# Patient Record
Sex: FEMALE | Race: WHITE | NOT HISPANIC OR LATINO | Employment: UNEMPLOYED | URBAN - NONMETROPOLITAN AREA
[De-identification: names, ages, dates, MRNs, and addresses within clinical notes are randomized per-mention and may not be internally consistent; named-entity substitution may affect disease eponyms.]

---

## 2017-02-27 DIAGNOSIS — O00.109 TUBAL PREGNANCY WITHOUT INTRAUTERINE PREGNANCY: ICD-10-CM

## 2017-02-27 LAB
B-HCG SERPL-ACNC: NORMAL IU/L
ERYTHROCYTE [DISTWIDTH] IN BLOOD BY AUTOMATED COUNT: 11.4 % (ref 10–15)
HCT VFR BLD AUTO: 37.1 % (ref 35–47)
HGB BLD-MCNC: 13.2 G/DL (ref 11.7–15.7)
MCH RBC QN AUTO: 30.4 PG (ref 26.5–33)
MCHC RBC AUTO-ENTMCNC: 35.6 G/DL (ref 31.5–36.5)
MCV RBC AUTO: 86 FL (ref 78–100)
PLATELET # BLD AUTO: 215 10E9/L (ref 150–450)
PROGEST SERPL-MCNC: 18.6 NG/ML
RBC # BLD AUTO: 4.34 10E12/L (ref 3.8–5.2)
WBC # BLD AUTO: 5.2 10E9/L (ref 4–11)

## 2017-02-27 PROCEDURE — 84702 CHORIONIC GONADOTROPIN TEST: CPT | Performed by: OBSTETRICS & GYNECOLOGY

## 2017-02-27 PROCEDURE — 36415 COLL VENOUS BLD VENIPUNCTURE: CPT | Performed by: OBSTETRICS & GYNECOLOGY

## 2017-02-27 PROCEDURE — 84144 ASSAY OF PROGESTERONE: CPT | Mod: 90 | Performed by: OBSTETRICS & GYNECOLOGY

## 2017-02-27 PROCEDURE — 85027 COMPLETE CBC AUTOMATED: CPT | Performed by: OBSTETRICS & GYNECOLOGY

## 2017-02-27 PROCEDURE — 99000 SPECIMEN HANDLING OFFICE-LAB: CPT | Performed by: OBSTETRICS & GYNECOLOGY

## 2017-03-01 ENCOUNTER — TELEPHONE (OUTPATIENT)
Dept: OBGYN | Facility: OTHER | Age: 22
End: 2017-03-01

## 2017-03-01 DIAGNOSIS — R10.2 PELVIC CRAMPING: ICD-10-CM

## 2017-03-01 DIAGNOSIS — Z32.01 PREGNANCY EXAMINATION OR TEST, POSITIVE RESULT: ICD-10-CM

## 2017-03-01 DIAGNOSIS — Z87.59 HX OF ECTOPIC PREGNANCY: Primary | ICD-10-CM

## 2017-03-01 NOTE — TELEPHONE ENCOUNTER
Patient had appointment scheduled today with Dr. Mendez that had to be cancelled due to a delivery. History of ectopic. No bleeding or 1-sided pelvic pain. C/o very slight cramping. Per Dr. Mendez, radiology ultrasound ordered and patient will see Dr. Mendez on Monday.

## 2017-03-02 ENCOUNTER — HOSPITAL ENCOUNTER (OUTPATIENT)
Dept: ULTRASOUND IMAGING | Facility: HOSPITAL | Age: 22
Discharge: HOME | End: 2017-03-02
Attending: OBSTETRICS & GYNECOLOGY | Admitting: OBSTETRICS & GYNECOLOGY
Payer: COMMERCIAL

## 2017-03-02 PROCEDURE — 76817 TRANSVAGINAL US OBSTETRIC: CPT | Mod: TC

## 2017-03-06 ENCOUNTER — HOSPITAL ENCOUNTER (EMERGENCY)
Facility: HOSPITAL | Age: 22
Discharge: HOME | End: 2017-03-06
Attending: NURSE PRACTITIONER | Admitting: NURSE PRACTITIONER
Payer: COMMERCIAL

## 2017-03-06 ENCOUNTER — PRENATAL OFFICE VISIT (OUTPATIENT)
Dept: OBGYN | Facility: OTHER | Age: 22
End: 2017-03-06
Attending: OBSTETRICS & GYNECOLOGY
Payer: COMMERCIAL

## 2017-03-06 VITALS
SYSTOLIC BLOOD PRESSURE: 113 MMHG | DIASTOLIC BLOOD PRESSURE: 66 MMHG | OXYGEN SATURATION: 99 % | TEMPERATURE: 97.5 F | HEART RATE: 66 BPM | RESPIRATION RATE: 16 BRPM

## 2017-03-06 VITALS
HEART RATE: 66 BPM | HEIGHT: 64 IN | SYSTOLIC BLOOD PRESSURE: 144 MMHG | WEIGHT: 117 LBS | DIASTOLIC BLOOD PRESSURE: 80 MMHG | BODY MASS INDEX: 19.97 KG/M2

## 2017-03-06 DIAGNOSIS — Z3A.01 LESS THAN 8 WEEKS GESTATION OF PREGNANCY: ICD-10-CM

## 2017-03-06 DIAGNOSIS — R51.9 HEAD AND FACE PAIN: ICD-10-CM

## 2017-03-06 DIAGNOSIS — Z87.59 HX OF ECTOPIC PREGNANCY: Primary | ICD-10-CM
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
## 2017-03-06 DIAGNOSIS — Z32.01 PREGNANCY EXAMINATION OR TEST, POSITIVE RESULT: ICD-10-CM

## 2017-03-06 PROBLEM — K59.00 CONSTIPATION: Status: ACTIVE | Noted: 2017-03-06

## 2017-03-06 PROCEDURE — 99213 OFFICE O/P EST LOW 20 MIN: CPT | Performed by: NURSE PRACTITIONER

## 2017-03-06 PROCEDURE — 99213 OFFICE O/P EST LOW 20 MIN: CPT

## 2017-03-06 PROCEDURE — 99212 OFFICE O/P EST SF 10 MIN: CPT | Mod: 25 | Performed by: OBSTETRICS & GYNECOLOGY

## 2017-03-06 PROCEDURE — 76817 TRANSVAGINAL US OBSTETRIC: CPT | Performed by: OBSTETRICS & GYNECOLOGY

## 2017-03-06 RX ORDER — HYDROCODONE BITARTRATE AND ACETAMINOPHEN 5; 325 MG/1; MG/1
1 TABLET ORAL 3 TIMES DAILY PRN
Qty: 10 TABLET | Refills: 0 | Status: SHIPPED | OUTPATIENT
Start: 2017-03-06 | End: 2017-03-22

## 2017-03-06 RX ORDER — ACETAMINOPHEN 500 MG
500-1000 TABLET ORAL EVERY 6 HOURS PRN
Qty: 60 TABLET | Refills: 0 | Status: SHIPPED | OUTPATIENT
Start: 2017-03-06 | End: 2017-12-12

## 2017-03-06 ASSESSMENT — ENCOUNTER SYMPTOMS
NECK PAIN: 0
CARDIOVASCULAR NEGATIVE: 1
RESPIRATORY NEGATIVE: 1
WEAKNESS: 0
GASTROINTESTINAL NEGATIVE: 1
CONSTITUTIONAL NEGATIVE: 1
PSYCHIATRIC NEGATIVE: 1
HEADACHES: 1
FACIAL ASYMMETRY: 0
DIZZINESS: 0
NECK STIFFNESS: 0
EYES NEGATIVE: 1
BACK PAIN: 0
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
## 2017-03-06 NOTE — LETTER
[address] [place] [address]  [phone]      April 13, 2017      Marisol Monroy  [address] [facility] [address]        Dear Marisol,      Thank you for coming to the [facility]. We were unable to reach you by phone. This letter is to inform of your results from your first trimester blood work and nuchal translucency ultrasound. This is a screening for Down syndrome, Trisomy 13 and Trisomy 18. Your results are within normal range. This is a good and reassuring result. If you have questions pertaining to this results, please call the nurse at [phone]        Sincerely,      Digna Mendez MD/Sharon KEVIN LPN

## 2017-03-06 NOTE — ED AVS SNAPSHOT
[facility]    [address] [address]    Phone:  [phone]                                       Marisol Monroy   MRN: [id number]    Department:  [facility]   Date of Visit:  3/6/2017           After Visit Summary Signature Page     I have received my discharge instructions, and my questions have been answered. I have discussed any challenges I see with this plan with the nurse or doctor.    ..........................................................................................................................................  Patient/Patient Representative Signature      ..........................................................................................................................................  Patient Representative Print Name and Relationship to Patient    ..................................................               ................................................  Date                                            Time    ..........................................................................................................................................  Reviewed by Signature/Title    ...................................................              ..............................................  Date                                                            Time

## 2017-03-09 ASSESSMENT — ANXIETY QUESTIONNAIRES
1. FEELING NERVOUS, ANXIOUS, OR ON EDGE: NOT AT ALL
7. FEELING AFRAID AS IF SOMETHING AWFUL MIGHT HAPPEN: SEVERAL DAYS
5. BEING SO RESTLESS THAT IT IS HARD TO SIT STILL: MORE THAN HALF THE DAYS
IF YOU CHECKED OFF ANY PROBLEMS ON THIS QUESTIONNAIRE, HOW DIFFICULT HAVE THESE PROBLEMS MADE IT FOR YOU TO DO YOUR WORK, TAKE CARE OF THINGS AT HOME, OR GET ALONG WITH OTHER PEOPLE: NOT DIFFICULT AT ALL
2. NOT BEING ABLE TO STOP OR CONTROL WORRYING: SEVERAL DAYS
3. WORRYING TOO MUCH ABOUT DIFFERENT THINGS: SEVERAL DAYS
GAD7 TOTAL SCORE: 8
6. BECOMING EASILY ANNOYED OR IRRITABLE: MORE THAN HALF THE DAYS

## 2017-03-09 ASSESSMENT — PATIENT HEALTH QUESTIONNAIRE - PHQ9: 5. POOR APPETITE OR OVEREATING: SEVERAL DAYS

## 2017-03-10 ASSESSMENT — ANXIETY QUESTIONNAIRES: GAD7 TOTAL SCORE: 8

## 2017-03-10 ASSESSMENT — PATIENT HEALTH QUESTIONNAIRE - PHQ9: SUM OF ALL RESPONSES TO PHQ QUESTIONS 1-9: 4

## 2017-03-22 ENCOUNTER — PRENATAL OFFICE VISIT (OUTPATIENT)
Dept: OBGYN | Facility: OTHER | Age: 22
End: 2017-03-22
Attending: OBSTETRICS & GYNECOLOGY
Payer: COMMERCIAL

## 2017-03-22 VITALS
DIASTOLIC BLOOD PRESSURE: 62 MMHG | SYSTOLIC BLOOD PRESSURE: 122 MMHG | BODY MASS INDEX: 20.66 KG/M2 | WEIGHT: 121 LBS | HEIGHT: 64 IN

## 2017-03-22 DIAGNOSIS — Z34.80 SUPERVISION OF OTHER NORMAL PREGNANCY, ANTEPARTUM: Primary | ICD-10-CM

## 2017-03-22 PROCEDURE — 99207 ZZC PRENATAL VISIT: CPT | Mod: 25 | Performed by: OBSTETRICS & GYNECOLOGY

## 2017-03-22 PROCEDURE — 76817 TRANSVAGINAL US OBSTETRIC: CPT | Performed by: OBSTETRICS & GYNECOLOGY

## 2017-03-22 RX ORDER — PRENATAL VIT/IRON FUM/FOLIC AC 27MG-0.8MG
1 TABLET ORAL DAILY
COMMUNITY
End: 2018-04-17

## 2017-03-22 NOTE — MR AVS SNAPSHOT
After Visit Summary   3/22/2017    Marisol Monroy    MRN: [id number]           Patient Information     Date Of Birth          1995        Visit Information        Provider Department      3/22/2017 9:00 AM Digna Mendez MD [facility]        Today's Diagnoses     Supervision of other normal pregnancy, antepartum    -  1      Care Instructions    Return for New OB visit.        Follow-ups after your visit        Your next 10 appointments already scheduled     Apr 03, 2017  9:00 AM CDT   (Arrive by 8:45 AM)   New Prenatal with Digna Mendez MD   [facility] ([facility])    [address]  [facility] [address]   [phone]            Apr 10, 2017 11:00 AM CDT   Radiology with [facility] ULTRASOUND 2   [facility] ([facility] )    [address] [place] [address]   [phone]            Jun 06, 2017  9:00 AM CDT   Radiology with HI ULTRASOUND 2   [facility] ([facility] )    [address] [place] [address]   [phone]            Dec 07, 2017 10:00 AM CST   (Arrive by 9:45 AM)   [facility]    [address]  [place] [address]   [phone]           Bring a current list of meds and any records pertaining to this visit.  For Physicals, please bring immunization records and any forms needing to be filled out.  Please arrive 10 minutes early to complete paperwork.              Who to contact     If you have questions or need follow up information about today's clinic visit or your schedule please contact [facility] directly at [phone].  Normal or non-critical lab and imaging results will be communicated to you by MyChart, letter or phone within 4 business days after the clinic has received the results. If you do not hear from us within 7 days, please contact the clinic through MyChart or phone. If you have a critical or  "abnormal lab result, we will notify you by phone as soon as possible.  Submit refill requests through [employer] or call your pharmacy and they will forward the refill request to us. Please allow 3 business days for your refill to be completed.          Additional Information About Your Visit        [employer] Information     [employer] lets you send messages to your doctor, view your test results, renew your prescriptions, schedule appointments and more. To sign up, go to [email or web address] . Click on \"Log in\" on the left side of the screen, which will take you to the Welcome page. Then click on \"Sign up Now\" on the right side of the page.     You will be asked to enter the access code listed below, as well as some personal information. Please follow the directions to create your username and password.     Your access code is: [email or web address]  Expires: 2017 12:25 PM     Your access code will  in 90 days. If you need help or a new code, please call your [place] clinic or [phone].        Care EveryWhere ID     This is your Care EveryWhere ID. This could be used by other organizations to access your [place] medical records  [id number]        Your Vitals Were     Height BMI (Body Mass Index)                5' 4\" (1.626 m) 20.77 kg/m2           Blood Pressure from Last 3 Encounters:   17 122/62   17 113/66   17 144/80    Weight from Last 3 Encounters:   17 121 lb (54.9 kg)   17 117 lb (53.1 kg)   16 125 lb (56.7 kg)              We Performed the Following     US OB (Clinic Only)        Primary Care Provider    None       No address on file        Thank you!     Thank you for choosing [facility] [place]  for your care. Our goal is always to provide you with excellent care. Hearing back from our patients is one way we can continue to improve our services. Please take a few minutes to complete the written survey that you may receive in the mail after your visit with us. " Thank you!             Your Updated Medication List - Protect others around you: Learn how to safely use, store and throw away your medicines at www.disposemymeds.org.          This list is accurate as of: 3/22/17  4:19 PM.  Always use your most recent med list.                   Brand Name Dispense Instructions for use    acetaminophen 500 MG tablet    TYLENOL    60 tablet    Take 1-2 tablets (500-1,000 mg) by mouth every 6 hours as needed for mild pain       prenatal multivitamin  plus iron 27-0.8 MG Tabs per tablet      Take 1 tablet by mouth daily

## 2017-03-22 NOTE — PROGRESS NOTES
"Here for doc of dates and viability  /62  Ht 5' 4\" (1.626 m)  Wt 121 lb (54.9 kg)  BMI 20.77 kg/m2      Uterus: normal  Gest. Sac: reg  Yolk Sac: p  Fetal Pole: 8w6d with good fm  FHT: +  Fluid: normal  Gest Age: 8w6d  Sc =sc with no dates  Done by Digna Mendez MD    A: IUP    P: rto NOB  11 wk NT etc    Greater than  15 minutes were spent counseling this patient face to face in addition to the ultrasound.      "

## 2017-04-03 ENCOUNTER — PRENATAL OFFICE VISIT (OUTPATIENT)
Dept: OBGYN | Facility: OTHER | Age: 22
End: 2017-04-03
Attending: OBSTETRICS & GYNECOLOGY
Payer: COMMERCIAL

## 2017-04-03 VITALS
HEIGHT: 64 IN | DIASTOLIC BLOOD PRESSURE: 60 MMHG | WEIGHT: 121 LBS | BODY MASS INDEX: 20.66 KG/M2 | SYSTOLIC BLOOD PRESSURE: 112 MMHG

## 2017-04-03 DIAGNOSIS — Z34.80 SUPERVISION OF OTHER NORMAL PREGNANCY, ANTEPARTUM: Primary | ICD-10-CM
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
## 2017-04-03 PROCEDURE — 99207 ZZC FIRST OB VISIT: CPT | Performed by: OBSTETRICS & GYNECOLOGY

## 2017-04-03 NOTE — PROGRESS NOTES
NEW OB VISIT  Marisol Monroy is a 21 year old  at 10w5d presenting for a new ob visit.      Currently taking PNV? y    MEDICAL HISTORY:  Diabetes: n  Hypertension: n  Heart Disease: n  Autoimmune disorder: n  Kidney Disease/UTI: n  Neurologic Disease/Epilepsy: n  Psychiatric Disease: n  Depression/Postpartum Depression: n  Varicositites/Phlebitis: n  Hepatitis/Liver Disease: n  Thyroid Dysfunction: n  Trauma/Violence: n  History of Blood Transfusion: n  Tobacco Use: n  Alcohol Use: n  Illicit/Recreational Drugs: n  D (Rh Sensitized): n  Pulmonary Disease (TB/Asthma): n  Drug/Latex Allergies/Reactions: n  Breast: n  GYN Surgery: y  Operations/Hospitalizations: ectopic  Anesthetic Complications: n  History of Abnormal Pap: n  Uterine Anomalies/KALE: n  Infertility: n  ART Treatment: n  Relevant Family History: n   Other/Comments: n    INFECTION HISTORY:  Live with someone with TB or exposed to TB: n  Patient or Partner has history of Genital Herpes: n  Rash or viral illness or fever since LMP: n  Hepatitis B or C: n  History of STI (Gonorrhea, Chlamydia, HPV, HIV, Syphilis): n  Zika exposure n  Other: n  Cats n    BABY DOC: RE             Breast feeding: y RD y      IMMUNIZATION HISTORY:  Chicken Pox: y  Flu Vaccine:  In fall  Pneumococcal if smoker or RAD:  n  Tdap: in 3  Gardasil: needs  Other/comments: n    FAMILY HISTORY  Diabetes: n  Hypertension: m and br  CVA/Stroke: n  Lupus: n  Cancers: Breast  pgm ovarian n,colon n,uterine: n           Genetics Screening/Teratology Counseling:  Includes Patient, Baby's Father, or anyone in either family with:  Patient's age 35 years or older as of estimated date of delivery:  n  Thalassemia: MCV less than 80: n  Neural Tube defects: n  Congenital Heart Defects: n  Down syndrome: n  Hugo-Sachs: n  Canavan Disease: n  Familial Dysautonomia: n  Sickle Cell Disease or Trait: n  Hemophilia or other blood disorders: n  Muscular Dystrophy: n  Cystic Fibrosis: n  Haskell's  "Chorea: n  Mental Retardation or Autism: n  Other genetic or chromosomal disorders: n  Maternal Metabolic Disorder (Type 1 DM, PKU): n  Patient or baby's father with birth defects not listed above: n  Recurrent pregnancy loss or stillbirth: n  Medications (Supplements, drugs)/ Illicit/ Recreational drugs/ Alcohol since LMP: n  Other/Comments: n    Review Of Systems:   C:     NEGATIVE for fever, chills, change in weight  I:       NEGATIVE for worrisome rashes, moles or lesions  E:     NEGATIVE for vision changes or irritation  E/M: NEGATIVE for ear, mouth and throat problems  R:     NEGATIVE for significant cough or SOB  CV:   NEGATIVE for chest pain, palpitations or peripheral edema  GI:     NEGATIVE for unusual nausea, abdominal pain, heartburn, or change in bowel n  :   NEGATIVE for frequency, dysuria, hematuria, vaginal discharge or bleeding  M:     NEGATIVE for significant arthralgias or myalgia  N:      NEGATIVE for weakness, dizziness or paresthesias  E:      NEGATIVE for temperature intolerance, skin/hair changes  P:      NEGATIVE for changes in mood or affect.     PHYSICAL EXAM:   /60  Ht 5' 4\" (1.626 m)  Wt 121 lb (54.9 kg)  BMI 20.77 kg/m2   BMI: Body mass index is 20.77 kg/(m^2).  Constitutional: healthy, alert and no distress  Head: Normocephalic. No masses, lesions, tenderness or abnormalities  Neck: Neck supple. Trachea midline. No adenopathy. Thyroid symmetric, normal size.   Cardiovascular: RRR.   Respiratory: lungs clear   Breast: Breasts reveal mild symmetric fibrocystic densities, but there are no dominant, discrete, fixed or suspicious masses found.  Gastrointestinal: Abdomen soft, non-tender, non-distended. No masses, organomegaly.  Pelvic:  Vulva:  No external lesions, normal female hair distribution, no inguinal adenopathy.    Urethra:  Midline, non-tender, well supported, no discharge  Vagina:  Moist, pink, no abnormal discharge, no lesions  Uterus:   gravid , non-tender  Ovaries:  " No masses appreciated  Rectal Exam: deferred    Musculoskeletal: extremities normal  Skin: no suspicious lesions or rashes  Psychiatric: Affect appropriate, cooperative,mentation appears normal.     Risk assessment done. Level is   mod    ASSESSMENT:   IUP with hx ectopic    PLAN:  Prenatal labs done  11-13 weeks 1st trimester NT/Bloodwork  16 wk MSAFP  Cell free DNA discussed  Level II Ultrasound at 20 weeks   Tdap at 27 weeks  Check MIIC  Flu shot in fall  RTO 1 wk    Greater than 25 were spent in face to face counseling and interview by me for this initial new ob visit.  Digna Mendez MD

## 2017-04-03 NOTE — MR AVS SNAPSHOT
After Visit Summary   4/3/2017    Marisol Monroy    MRN: [id number]           Patient Information     Date Of Birth          1995        Visit Information        Provider Department      4/3/2017 9:00 AM Digna Mendez MD [facility] [place]        Today's Diagnoses     Supervision of other normal pregnancy, antepartum    -  1      Care Instructions    1st trimester screening blood work and nuchal translucency ultrasound @ 11-13 6/7 weeks. You will receive a call from ultrasound to schedule this appt. Please call the nurse at [phone] if you are not contacted by 12 weeks with an appointment time.  MSAFP in lab @ 15-16 weeks.  Level 2 ultrasound @ 18-22 weeks. You will receive a call from ultrasound to make this appt. Please contact the nurse if you have not been contacted with an appointment time by 17 weeks.   Tdap vaccine at 27 weeks.  Flu shot in fall.  Return to clinic in 1 week.                Follow-ups after your visit        Your next 10 appointments already scheduled     Apr 10, 2017 11:00 AM CDT   Radiology with HI ULTRASOUND 2   [facility] ([facility] )    [address] [place] [address]   [phone]            Jun 06, 2017  9:00 AM CDT   Radiology with HI ULTRASOUND 2   [facility] ([facility] )    [address] [place] [address]   [phone]            Dec 07, 2017 10:00 AM CST   (Arrive by 9:45 AM)   Office Visit with BELINDA Snow CNM   [facility] ([facility])    [address] [facility] [address]   [phone]           Bring a current list of meds and any records pertaining to this visit.  For Physicals, please bring immunization records and any forms needing to be filled out.  Please arrive 10 minutes early to complete paperwork.              Who to contact     If you have questions or need follow up information about today's clinic visit or your schedule please contact [facility]  "[place] directly at [phone].  Normal or non-critical lab and imaging results will be communicated to you by MyChart, letter or phone within 4 business days after the clinic has received the results. If you do not hear from us within 7 days, please contact the clinic through [employer] or phone. If you have a critical or abnormal lab result, we will notify you by phone as soon as possible.  Submit refill requests through [employer] or call your pharmacy and they will forward the refill request to us. Please allow 3 business days for your refill to be completed.          Additional Information About Your Visit        [employer] Information     [employer] lets you send messages to your doctor, view your test results, renew your prescriptions, schedule appointments and more. To sign up, go to [email or web address] . Click on \"Log in\" on the left side of the screen, which will take you to the Welcome page. Then click on \"Sign up Now\" on the right side of the page.     You will be asked to enter the access code listed below, as well as some personal information. Please follow the directions to create your username and password.     Your access code is: [email or web address]  Expires: 2017 12:25 PM     Your access code will  in 90 days. If you need help or a new code, please call your [place] clinic or [phone].        Care EveryWhere ID     This is your Care EveryWhere ID. This could be used by other organizations to access your [place] medical records  [id number]        Your Vitals Were     Height BMI (Body Mass Index)                5' 4\" (1.626 m) 20.77 kg/m2           Blood Pressure from Last 3 Encounters:   17 112/60   17 122/62   17 113/66    Weight from Last 3 Encounters:   17 121 lb (54.9 kg)   17 121 lb (54.9 kg)   17 117 lb (53.1 kg)              Today, you had the following     No orders found for display       Primary Care Provider    None       No address on file      "   Thank you!     Thank you [facility]  for your care. Our goal is always to provide you with excellent care. Hearing back from our patients is one way we can continue to improve our services. Please take a few minutes to complete the written survey that you may receive in the mail after your visit with us. Thank you!             Your Updated Medication List - Protect others around you: Learn how to safely use, store and throw away your medicines at www.disposemymeds.org.          This list is accurate as of: 4/3/17  9:38 AM.  Always use your most recent med list.                   Brand Name Dispense Instructions for use    acetaminophen 500 MG tablet    TYLENOL    60 tablet    Take 1-2 tablets (500-1,000 mg) by mouth every 6 hours as needed for mild pain       prenatal multivitamin  plus iron 27-0.8 MG Tabs per tablet      Take 1 tablet by mouth daily

## 2017-04-10 ENCOUNTER — HOSPITAL ENCOUNTER (OUTPATIENT)
Dept: ULTRASOUND IMAGING | Facility: HOSPITAL | Age: 22
Discharge: HOME | End: 2017-04-10
Attending: OBSTETRICS & GYNECOLOGY | Admitting: OBSTETRICS & GYNECOLOGY
Payer: COMMERCIAL

## 2017-04-10 PROCEDURE — 76813 OB US NUCHAL MEAS 1 GEST: CPT | Mod: TC

## 2017-04-10 PROCEDURE — 36415 COLL VENOUS BLD VENIPUNCTURE: CPT

## 2017-04-18 ENCOUNTER — PRENATAL OFFICE VISIT (OUTPATIENT)
Dept: OBGYN | Facility: OTHER | Age: 22
End: 2017-04-18
Attending: OBSTETRICS & GYNECOLOGY
Payer: COMMERCIAL

## 2017-04-18 VITALS — BODY MASS INDEX: 20.6 KG/M2 | WEIGHT: 120 LBS | DIASTOLIC BLOOD PRESSURE: 58 MMHG | SYSTOLIC BLOOD PRESSURE: 118 MMHG

## 2017-04-18 DIAGNOSIS — Z34.80 SUPERVISION OF OTHER NORMAL PREGNANCY, ANTEPARTUM: Primary | ICD-10-CM

## 2017-04-18 PROCEDURE — 99207 ZZC PRENATAL VISIT: CPT | Performed by: OBSTETRICS & GYNECOLOGY

## 2017-04-18 NOTE — MR AVS SNAPSHOT
After Visit Summary   4/18/2017    Marisol Monroy    MRN: [id number]           Patient Information     Date Of Birth          1995        Visit Information        Provider Department      4/18/2017 10:50 AM Digna Mendez MD [facility]        Today's Diagnoses     Supervision of other normal pregnancy, antepartum    -  1       Follow-ups after your visit        Your next 10 appointments already scheduled     Apr 25, 2017 10:50 AM CDT   (Arrive by 10:35 AM)   Home Follow Up with Digna Mendez MD   [facility] [place] ([facility] [place] [facility])    [address]  [place] [address]   [phone]            May 03, 2017  8:30 AM CDT   LAB with HC LAB   [facility] ([facility] [place] [facility])    [address]  [place] [address]   [phone]            May 03, 2017  8:40 AM CDT   (Arrive by 8:25 AM)   ESTABLISHED PRENATAL with Digna Mendez MD   [facility] ([facility] [place] [facility])    [address]  [place] [address]   [phone]            Jun 06, 2017  9:00 AM CDT   Radiology with HI ULTRASOUND 2   [facility] ([facility] )    [address] [place] [address] [place] [address]   [phone]            Dec 07, 2017 10:00 AM CST   (Arrive by 9:45 AM)   Office Visit with BELINDA Snow [facility] [place] ([facility] [place] [facility])    [address]  [place] [address]   [phone]           Bring a current list of meds and any records pertaining to this visit.  For Physicals, please bring immunization records and any forms needing to be filled out.  Please arrive 10 minutes early to complete paperwork.              Who to contact     If you have questions or need follow up information about today's clinic visit or your schedule please contact [facility] directly at [phone].  Normal or non-critical lab and imaging results will be communicated to you by MyChart, letter or phone within 4 business days after the  "clinic has received the results. If you do not hear from us within 7 days, please contact the clinic through [employer] or phone. If you have a critical or abnormal lab result, we will notify you by phone as soon as possible.  Submit refill requests through [employer] or call your pharmacy and they will forward the refill request to us. Please allow 3 business days for your refill to be completed.          Additional Information About Your Visit        [employer] Information     [employer] lets you send messages to your doctor, view your test results, renew your prescriptions, schedule appointments and more. To sign up, go to [email or web address] . Click on \"Log in\" on the left side of the screen, which will take you to the Welcome page. Then click on \"Sign up Now\" on the right side of the page.     You will be asked to enter the access code listed below, as well as some personal information. Please follow the directions to create your username and password.     Your access code is: [email or web address]  Expires: 2017 12:25 PM     Your access code will  in 90 days. If you need help or a new code, please call your [place] clinic or [phone].        Care EveryWhere ID     This is your Care EveryWhere ID. This could be used by other organizations to access your [place] medical records  [id number]        Your Vitals Were     BMI (Body Mass Index)                   20.6 kg/m2            Blood Pressure from Last 3 Encounters:   17 118/58   17 112/60   17 122/62    Weight from Last 3 Encounters:   17 120 lb (54.4 kg)   17 121 lb (54.9 kg)   17 121 lb (54.9 kg)              Today, you had the following     No orders found for display       Primary Care Provider    None       No address on file        Thank you!     Thank you for choosing [facility] [place]  for your care. Our goal is always to provide you with excellent care. Hearing back from our patients is one way we can " continue to improve our services. Please take a few minutes to complete the written survey that you may receive in the mail after your visit with us. Thank you!             Your Updated Medication List - Protect others around you: Learn how to safely use, store and throw away your medicines at www.disposemymeds.org.          This list is accurate as of: 4/18/17 12:45 PM.  Always use your most recent med list.                   Brand Name Dispense Instructions for use    acetaminophen 500 MG tablet    TYLENOL    60 tablet    Take 1-2 tablets (500-1,000 mg) by mouth every 6 hours as needed for mild pain       prenatal multivitamin  plus iron 27-0.8 MG Tabs per tablet      Take 1 tablet by mouth daily

## 2017-05-03 ENCOUNTER — PRENATAL OFFICE VISIT (OUTPATIENT)
Dept: OBGYN | Facility: OTHER | Age: 22
End: 2017-05-03
Attending: OBSTETRICS & GYNECOLOGY
Payer: COMMERCIAL

## 2017-05-03 VITALS — WEIGHT: 124 LBS | DIASTOLIC BLOOD PRESSURE: 52 MMHG | BODY MASS INDEX: 21.28 KG/M2 | SYSTOLIC BLOOD PRESSURE: 112 MMHG

## 2017-05-03 DIAGNOSIS — Z34.80 SUPERVISION OF OTHER NORMAL PREGNANCY, ANTEPARTUM: Primary | ICD-10-CM

## 2017-05-03 DIAGNOSIS — Z32.01 PREGNANCY EXAMINATION OR TEST, POSITIVE RESULT: ICD-10-CM

## 2017-05-03 PROCEDURE — 99207 ZZC PRENATAL VISIT: CPT | Performed by: OBSTETRICS & GYNECOLOGY

## 2017-05-03 PROCEDURE — 99000 SPECIMEN HANDLING OFFICE-LAB: CPT | Performed by: OBSTETRICS & GYNECOLOGY

## 2017-05-03 PROCEDURE — 36415 COLL VENOUS BLD VENIPUNCTURE: CPT | Performed by: OBSTETRICS & GYNECOLOGY

## 2017-05-03 PROCEDURE — 82105 ALPHA-FETOPROTEIN SERUM: CPT | Mod: 90 | Performed by: OBSTETRICS & GYNECOLOGY

## 2017-05-03 NOTE — MR AVS SNAPSHOT
After Visit Summary   5/3/2017    Marisol Monroy    MRN: [id number]           Patient Information     Date Of Birth          1995        Visit Information        Provider Department      5/3/2017 8:40 AM Digna Mendez MD [facility]        Today's Diagnoses     Supervision of other normal pregnancy, antepartum    -  1      Care Instructions    Return to clinic in 4 weeks.          Follow-ups after your visit        Your next 10 appointments already scheduled     Jun 06, 2017  9:00 AM CDT   Radiology with HI ULTRASOUND 2   [facility] ([facility] )    [address] [facility] [address] [phone]            Dec 07, 2017 10:00 AM CST   (Arrive by 9:45 AM)   Office Visit with BELINDA Snow CNM   [facility] ([facility])    [address] [facility] [address] [phone]           Bring a current list of meds and any records pertaining to this visit.  For Physicals, please bring immunization records and any forms needing to be filled out.  Please arrive 10 minutes early to complete paperwork.              Who to contact     If you have questions or need follow up information about today's clinic visit or your schedule please contact [facility] directly at [phone].  Normal or non-critical lab and imaging results will be communicated to you by [employer], letter or phone within 4 business days after the clinic has received the results. If you do not hear from us within 7 days, please contact the clinic through [employer] or phone. If you have a critical or abnormal lab result, we will notify you by phone as soon as possible.  Submit refill requests through [employer] or call your pharmacy and they will forward the refill request to us. Please allow 3 business days for your refill to be completed.          Additional Information About Your Visit        [employer] Information     [employer] lets you send messages to your doctor,  "view your test results, renew your prescriptions, schedule appointments and more. To sign up, go to [email or web address] . Click on \"Log in\" on the left side of the screen, which will take you to the Welcome page. Then click on \"Sign up Now\" on the right side of the page.     You will be asked to enter the access code listed below, as well as some personal information. Please follow the directions to create your username and password.     Your access code is: [email or web address]  Expires: 2017 12:25 PM     Your access code will  in 90 days. If you need help or a new code, please call your [place] clinic or [phone].        Care EveryWhere ID     This is your Care EveryWhere ID. This could be used by other organizations to access your [place] medical records  [id number]        Your Vitals Were     BMI (Body Mass Index)                   21.28 kg/m2            Blood Pressure from Last 3 Encounters:   17 112/52   17 118/58   17 112/60    Weight from Last 3 Encounters:   17 124 lb (56.2 kg)   17 120 lb (54.4 kg)   17 121 lb (54.9 kg)              Today, you had the following     No orders found for display       Primary Care Provider    None       No address on file        Thank you!     Thank you for choosing [facility] [place]  for your care. Our goal is always to provide you with excellent care. Hearing back from our patients is one way we can continue to improve our services. Please take a few minutes to complete the written survey that you may receive in the mail after your visit with us. Thank you!             Your Updated Medication List - Protect others around you: Learn how to safely use, store and throw away your medicines at www.disposemymeds.org.          This list is accurate as of: 5/3/17  9:34 AM.  Always use your most recent med list.                   Brand Name Dispense Instructions for use    acetaminophen 500 MG tablet    TYLENOL    60 tablet    " Take 1-2 tablets (500-1,000 mg) by mouth every 6 hours as needed for mild pain       prenatal multivitamin  plus iron 27-0.8 MG Tabs per tablet      Take 1 tablet by mouth daily

## 2017-05-05 LAB
# FETUSES US: NORMAL
AFP ADJ MOM AMN: 0.67
AFP SERPL-MCNC: 22 NG/ML
AGE - REPORTED: 24.3
DATING METHOD: NORMAL
DIABETIC AT CONCEPTION: NO
FAMILY MEMBER DISEASES HX: NO
FAMILY MEMBER DISEASES HX: NO
GA METHOD: NORMAL
GA: 15 WK
HX OF HEREDITARY DISORDERS: NO
IDDM PATIENT QL: NO
INTEGRATED SCN PATIENT-IMP: NORMAL
LMP START DATE: NORMAL
PREV HX CHROMOSOME ABNORMALITY: NORMAL
SPECIMEN DRAWN SERPL: NORMAL
TWINS: NORMAL

## 2017-06-06 ENCOUNTER — HOSPITAL ENCOUNTER (OUTPATIENT)
Dept: ULTRASOUND IMAGING | Facility: CLINIC | Age: 22
End: 2017-06-06
Attending: OBSTETRICS & GYNECOLOGY
Payer: COMMERCIAL

## 2017-06-06 ENCOUNTER — HOSPITAL ENCOUNTER (OUTPATIENT)
Dept: ULTRASOUND IMAGING | Facility: HOSPITAL | Age: 22
Discharge: HOME | End: 2017-06-06
Attending: OBSTETRICS & GYNECOLOGY | Admitting: OBSTETRICS & GYNECOLOGY
Payer: COMMERCIAL

## 2017-06-06 DIAGNOSIS — O26.90 PREGNANCY RELATED CONDITION, UNSPECIFIED TRIMESTER: ICD-10-CM

## 2017-06-06 PROCEDURE — 76811 OB US DETAILED SNGL FETUS: CPT | Mod: TC

## 2017-08-08 ENCOUNTER — PRENATAL OFFICE VISIT (OUTPATIENT)
Dept: OBGYN | Facility: OTHER | Age: 22
End: 2017-08-08
Attending: OBSTETRICS & GYNECOLOGY
Payer: COMMERCIAL

## 2017-08-08 VITALS — DIASTOLIC BLOOD PRESSURE: 64 MMHG | SYSTOLIC BLOOD PRESSURE: 118 MMHG | WEIGHT: 132 LBS | BODY MASS INDEX: 22.66 KG/M2

## 2017-08-08 DIAGNOSIS — Z23 NEED FOR TDAP VACCINATION: ICD-10-CM

## 2017-08-08 DIAGNOSIS — Z34.80 SUPERVISION OF OTHER NORMAL PREGNANCY, ANTEPARTUM: Primary | ICD-10-CM

## 2017-08-08 DIAGNOSIS — Z23 NEED FOR VACCINATION: ICD-10-CM

## 2017-08-08 PROBLEM — O09.33 INSUFFICIENT PRENATAL CARE IN THIRD TRIMESTER: Status: ACTIVE | Noted: 2017-08-08

## 2017-08-08 LAB
ALBUMIN UR-MCNC: NEGATIVE MG/DL
AMPHETAMINES UR QL SCN: NORMAL
APPEARANCE UR: CLEAR
BACTERIA #/AREA URNS HPF: ABNORMAL /HPF
BARBITURATES UR QL: NORMAL
BENZODIAZ UR QL: NORMAL
BILIRUB UR QL STRIP: NEGATIVE
CANNABINOIDS UR QL SCN: NORMAL
COCAINE UR QL: NORMAL
COLOR UR AUTO: ABNORMAL
ERYTHROCYTE [DISTWIDTH] IN BLOOD BY AUTOMATED COUNT: 12.1 % (ref 10–15)
GLUCOSE 1H P 50 G GLC PO SERPL-MCNC: 115 MG/DL (ref 60–129)
GLUCOSE UR STRIP-MCNC: NEGATIVE MG/DL
HCT VFR BLD AUTO: 33 % (ref 35–47)
HGB BLD-MCNC: 11.7 G/DL (ref 11.7–15.7)
HGB UR QL STRIP: NEGATIVE
KETONES UR STRIP-MCNC: NEGATIVE MG/DL
LEUKOCYTE ESTERASE UR QL STRIP: NEGATIVE
MCH RBC QN AUTO: 31.6 PG (ref 26.5–33)
MCHC RBC AUTO-ENTMCNC: 35.5 G/DL (ref 31.5–36.5)
MCV RBC AUTO: 89 FL (ref 78–100)
METHADONE UR QL SCN: NORMAL
MUCOUS THREADS #/AREA URNS LPF: PRESENT /LPF
NITRATE UR QL: NEGATIVE
OPIATES UR QL SCN: NORMAL
PCP UR QL SCN: NORMAL
PH UR STRIP: 7 PH (ref 4.7–8)
PLATELET # BLD AUTO: 227 10E9/L (ref 150–450)
RBC # BLD AUTO: 3.7 10E12/L (ref 3.8–5.2)
RBC #/AREA URNS AUTO: <1 /HPF (ref 0–2)
SP GR UR STRIP: 1.01 (ref 1–1.03)
SQUAMOUS #/AREA URNS AUTO: 4 /HPF (ref 0–1)
URN SPEC COLLECT METH UR: ABNORMAL
UROBILINOGEN UR STRIP-MCNC: NORMAL MG/DL (ref 0–2)
WBC # BLD AUTO: 9.9 10E9/L (ref 4–11)
WBC #/AREA URNS AUTO: 1 /HPF (ref 0–2)

## 2017-08-08 PROCEDURE — 85027 COMPLETE CBC AUTOMATED: CPT | Performed by: OBSTETRICS & GYNECOLOGY

## 2017-08-08 PROCEDURE — 82950 GLUCOSE TEST: CPT | Performed by: OBSTETRICS & GYNECOLOGY

## 2017-08-08 PROCEDURE — 36415 COLL VENOUS BLD VENIPUNCTURE: CPT | Performed by: OBSTETRICS & GYNECOLOGY

## 2017-08-08 PROCEDURE — 90471 IMMUNIZATION ADMIN: CPT | Performed by: OBSTETRICS & GYNECOLOGY

## 2017-08-08 PROCEDURE — 99207 ZZC COMPLICATED OB VISIT: CPT | Mod: 25 | Performed by: OBSTETRICS & GYNECOLOGY

## 2017-08-08 PROCEDURE — 90715 TDAP VACCINE 7 YRS/> IM: CPT | Performed by: OBSTETRICS & GYNECOLOGY

## 2017-08-08 PROCEDURE — 81001 URINALYSIS AUTO W/SCOPE: CPT | Mod: 59 | Performed by: OBSTETRICS & GYNECOLOGY

## 2017-08-08 PROCEDURE — 80307 DRUG TEST PRSMV CHEM ANLYZR: CPT | Performed by: OBSTETRICS & GYNECOLOGY

## 2017-08-08 NOTE — PATIENT INSTRUCTIONS
Lab and tdap today.  Return to clinic in 2 weeks  Cancel annual with Kirill Leon.    If you think your bag of water is broken; have bleeding like a period; think you are in labor; or are worried about your baby's movement, please call the labor and delivery unit at [phone].    Look at picture?

## 2017-08-08 NOTE — PROGRESS NOTES
28 Week Visit    Patient education provided on the following:  Effective positioning and latch techniques  Importance of early initiation of breastfeeding  Importance of rooming-in on a 24-hour basis    We reviewed the [place] [employer] Prenatal Toolkit in the [facility] Resource Book.  Patient questions and concerns addressed and reviewed. Support and encouragement provided.  MARIELENA CARLISLE        Immunization History   Administered Date(s) Administered     HPVQuadrivalent 10/26/2011     HepB-Peds 12/22/2011     Influenza (IIV3) 10/26/2011     TDAP Vaccine (Adacel) 08/08/2017     Tdap (Adacel,Boostrix) 10/26/2011     Tdap done.  MARIELENA CARLISLE

## 2017-08-08 NOTE — PROGRESS NOTES
Remove RE annual appointment  rto 2 wks  Look at the picture they have   1 hour BS,hp,mscc,uds with micro,tdap today

## 2017-08-08 NOTE — MR AVS SNAPSHOT
"              After Visit Summary   8/8/2017    Marisol Monroy    MRN: [id number]           Patient Information     Date Of Birth          1995        Visit Information        Provider Department      8/8/2017 10:40 AM [facility] MD [facility]        Today's Diagnoses     Supervision of other normal pregnancy, antepartum    -  1    Need for vaccination          Care Instructions    Lab and tdap today.  Return to clinic in 2 weeks  Cancel annual with Kirill Leon.    If you think your bag of water is broken; have bleeding like a period; think you are in labor; or are worried about your baby's movement, please call the labor and delivery unit at [phone].    Look at picture?            Follow-ups after your visit        Who to contact     If you have questions or need follow up information about today's clinic visit or your schedule please contact [facility] directly at [phone].  Normal or non-critical lab and imaging results will be communicated to you by MyChart, letter or phone within 4 business days after the clinic has received the results. If you do not hear from us within 7 days, please contact the clinic through MyChart or phone. If you have a critical or abnormal lab result, we will notify you by phone as soon as possible.  Submit refill requests through [employer] or call your pharmacy and they will forward the refill request to us. Please allow 3 business days for your refill to be completed.          Additional Information About Your Visit        MyChart Information     [employer] lets you send messages to your doctor, view your test results, renew your prescriptions, schedule appointments and more. To sign up, go to [email or web address] . Click on \"Log in\" on the left side of the screen, which will take you to the Welcome page. Then click on \"Sign up Now\" on the right side of the page.     You will be asked to enter the access code listed below, as well as some " personal information. Please follow the directions to create your username and password.     Your access code is: [email or web address]  Expires: 2017 11:30 AM     Your access code will  in 90 days. If you need help or a new code, please call your [place] clinic or [phone].        Care EveryWhere ID     This is your Care EveryWhere ID. This could be used by other organizations to access your [place] medical records  [id number]        Your Vitals Were     BMI (Body Mass Index)                   22.66 kg/m2            Blood Pressure from Last 3 Encounters:   17 118/64   17 112/52   17 118/58    Weight from Last 3 Encounters:   17 132 lb (59.9 kg)   17 124 lb (56.2 kg)   17 120 lb (54.4 kg)              We Performed the Following     1st  Administration  [86644]     CBC with platelets     Drug Screen Urine (Range)     Glucose tolerance gest screen 1 hour     TDAP VACCINE (ADACEL) [10272.002]     UA with Microscopic reflex to Culture        Primary Care Provider    None       No address on file        Equal Access to Services     NAN RICHMOND : Hadii amy Snell, wabuckda catalina, qachandata kaaltimmy kraus, sofiya angeles . So [facility] [phone].    ATENCIÓN: Si habla español, tiene a anton disposición servicios gratuitos de asistencia lingüística. Llame al [phone].    We comply with applicable federal civil rights laws and [place] laws. We do not discriminate on the basis of race, color, national origin, age, disability sex, sexual orientation or gender identity.            Thank you!     Thank you for choosing [facility] [place]  for your care. Our goal is always to provide you with excellent care. Hearing back from our patients is one way we can continue to improve our services. Please take a few minutes to complete the written survey that you may receive in the mail after your visit with us. Thank you!             Your  Updated Medication List - Protect others around you: Learn how to safely use, store and throw away your medicines at www.disposemymeds.org.          This list is accurate as of: 8/8/17 11:30 AM.  Always use your most recent med list.                   Brand Name Dispense Instructions for use Diagnosis    acetaminophen 500 MG tablet    TYLENOL    60 tablet    Take 1-2 tablets (500-1,000 mg) by mouth every 6 hours as needed for mild pain        prenatal multivitamin plus iron 27-0.8 MG Tabs per tablet      Take 1 tablet by mouth daily

## 2017-08-22 ENCOUNTER — PRENATAL OFFICE VISIT (OUTPATIENT)
Dept: OBGYN | Facility: OTHER | Age: 22
End: 2017-08-22
Attending: OBSTETRICS & GYNECOLOGY
Payer: COMMERCIAL

## 2017-08-22 VITALS — DIASTOLIC BLOOD PRESSURE: 70 MMHG | BODY MASS INDEX: 23.17 KG/M2 | WEIGHT: 135 LBS | SYSTOLIC BLOOD PRESSURE: 122 MMHG

## 2017-08-22 DIAGNOSIS — Z34.80 SUPERVISION OF OTHER NORMAL PREGNANCY, ANTEPARTUM: Primary | ICD-10-CM

## 2017-08-22 PROCEDURE — 99207 ZZC PRENATAL VISIT: CPT | Performed by: OBSTETRICS & GYNECOLOGY

## 2017-08-22 NOTE — MR AVS SNAPSHOT
"              After Visit Summary   8/22/2017    Marisol Monroy    MRN: [id number]           Patient Information     Date Of Birth          1995        Visit Information        Provider Department      8/22/2017 10:30 AM Digna Mendez MD [facility]        Today's Diagnoses     Supervision of other normal pregnancy, antepartum    -  1       Follow-ups after your visit        Your next 10 appointments already scheduled     Sep 06, 2017  8:50 AM CDT   (Arrive by 8:35 AM)   ESTABLISHED PRENATAL with Digna Mendez MD   [facility] ([facility] [place] )    [address]  [place] [address]   [phone]              Who to contact     If you have questions or need follow up information about today's clinic visit or your schedule please contact [facility] directly at [phone].  Normal or non-critical lab and imaging results will be communicated to you by MyChart, letter or phone within 4 business days after the clinic has received the results. If you do not hear from us within 7 days, please contact the clinic through MyChart or phone. If you have a critical or abnormal lab result, we will notify you by phone as soon as possible.  Submit refill requests through [employer] or call your pharmacy and they will forward the refill request to us. Please allow 3 business days for your refill to be completed.          Additional Information About Your Visit        MyChart Information     [employer] lets you send messages to your doctor, view your test results, renew your prescriptions, schedule appointments and more. To sign up, go to [email or web address] . Click on \"Log in\" on the left side of the screen, which will take you to the Welcome page. Then click on \"Sign up Now\" on the right side of the page.     You will be asked to enter the access code listed below, as well as some personal information. Please follow the directions to create your username and " password.     Your access code is: [email or web address]  Expires: 2017 11:30 AM     Your access code will  in 90 days. If you need help or a new code, please call your [place] clinic or [phone].        Care EveryWhere ID     This is your Care EveryWhere ID. This could be used by other organizations to access your [place] medical records  [id number]        Your Vitals Were     BMI (Body Mass Index)                   23.17 kg/m2            Blood Pressure from Last 3 Encounters:   17 122/70   17 118/64   17 112/52    Weight from Last 3 Encounters:   17 135 lb (61.2 kg)   17 132 lb (59.9 kg)   17 124 lb (56.2 kg)              Today, you had the following     No orders found for display       Primary Care Provider    None       No address on file        Equal Access to Services     SORAYA RICHMOND : Hadii amy ku colino Somagdalena, waaxda luqadaha, qaybta kaalmada adeegyada, waxay josephinein keisha angeles . So [facility] [phone].    ATENCIÓN: Si habla español, tiene a anton disposición servicios gratuitos de asistencia lingüística. Jaileneame al [phone].    We comply with applicable federal civil rights laws and [place] laws. We do not discriminate on the basis of race, color, national origin, age, disability sex, sexual orientation or gender identity.            Thank you!     Thank you for choosing [facility] [place]  for your care. Our goal is always to provide you with excellent care. Hearing back from our patients is one way we can continue to improve our services. Please take a few minutes to complete the written survey that you may receive in the mail after your visit with us. Thank you!             Your Updated Medication List - Protect others around you: Learn how to safely use, store and throw away your medicines at www.disposemymeds.org.          This list is accurate as of: 17 11:59 PM.  Always use your most recent med list.                   Brand  Name Dispense Instructions for use Diagnosis    acetaminophen 500 MG tablet    TYLENOL    60 tablet    Take 1-2 tablets (500-1,000 mg) by mouth every 6 hours as needed for mild pain        prenatal multivitamin plus iron 27-0.8 MG Tabs per tablet      Take 1 tablet by mouth daily

## 2017-09-13 ENCOUNTER — PRENATAL OFFICE VISIT (OUTPATIENT)
Dept: OBGYN | Facility: OTHER | Age: 22
End: 2017-09-13
Attending: OBSTETRICS & GYNECOLOGY
Payer: COMMERCIAL

## 2017-09-13 VITALS — WEIGHT: 139 LBS | BODY MASS INDEX: 23.86 KG/M2 | SYSTOLIC BLOOD PRESSURE: 110 MMHG | DIASTOLIC BLOOD PRESSURE: 66 MMHG

## 2017-09-13 DIAGNOSIS — Z34.80 SUPERVISION OF OTHER NORMAL PREGNANCY, ANTEPARTUM: Primary | ICD-10-CM

## 2017-09-13 DIAGNOSIS — Z23 NEED FOR PROPHYLACTIC VACCINATION AND INOCULATION AGAINST INFLUENZA: ICD-10-CM

## 2017-09-13 DIAGNOSIS — Z23 NEED FOR INFLUENZA VACCINATION: ICD-10-CM

## 2017-09-13 PROCEDURE — 99207 ZZC PRENATAL VISIT: CPT | Mod: 25 | Performed by: OBSTETRICS & GYNECOLOGY

## 2017-09-13 PROCEDURE — 90686 IIV4 VACC NO PRSV 0.5 ML IM: CPT | Performed by: OBSTETRICS & GYNECOLOGY

## 2017-09-13 PROCEDURE — 90471 IMMUNIZATION ADMIN: CPT | Performed by: OBSTETRICS & GYNECOLOGY

## 2017-09-13 NOTE — PROGRESS NOTES
Injectable Influenza Immunization Documentation    1.  Are you sick today? (Fever of 100.5 or higher on the day of the clinic)   No    2.  Have you ever had Guillain-Burton Syndrome within 6 weeks of an influenza vaccionation?  No    3. Do you have a life-threatening allergy to eggs?  No    4. Do you have a life-threatening allergy to a component of the vaccine? May include antibiotics, gelatin or latex.  No     5. Have you ever had a reaction to a dose of flu vaccine that needed immediate medical attention?  No     Form completed by MARIELENA CARLISLE

## 2017-09-13 NOTE — MR AVS SNAPSHOT
"              After Visit Summary   9/13/2017    Marisol Monroy    MRN: [id number]           Patient Information     Date Of Birth          1995        Visit Information        Provider Department      9/13/2017 8:50 AM Digna Mendez MD [facility]        Today's Diagnoses     Supervision of other normal pregnancy, antepartum    -  1    Need for prophylactic vaccination and inoculation against influenza          Care Instructions    Flu shot today.  Return to clinic in 1 week.  If you think your bag of water is broken; have bleeding like a period; think you are in labor; or are worried about your baby's movement, please call the labor and delivery unit at [phone].            Follow-ups after your visit        Who to contact     If you have questions or need follow up information about today's clinic visit or your schedule please contact [facility] [place] directly at [phone].  Normal or non-critical lab and imaging results will be communicated to you by MyChart, letter or phone within 4 business days after the clinic has received the results. If you do not hear from us within 7 days, please contact the clinic through [employer] or phone. If you have a critical or abnormal lab result, we will notify you by phone as soon as possible.  Submit refill requests through [employer] or call your pharmacy and they will forward the refill request to us. Please allow 3 business days for your refill to be completed.          Additional Information About Your Visit        MyChart Information     [employer] lets you send messages to your doctor, view your test results, renew your prescriptions, schedule appointments and more. To sign up, go to [email or web address] . Click on \"Log in\" on the left side of the screen, which will take you to the Welcome page. Then click on \"Sign up Now\" on the right side of the page.     You will be asked to enter the access code listed below, as well as some personal " information. Please follow the directions to create your username and password.     Your access code is: [email or web address]  Expires: 2017 11:30 AM     Your access code will  in 90 days. If you need help or a new code, please call your [place] clinic or [phone].        Care EveryWhere ID     This is your Care EveryWhere ID. This could be used by other organizations to access your [place] medical records  [id number]        Your Vitals Were     BMI (Body Mass Index)                   23.86 kg/m2            Blood Pressure from Last 3 Encounters:   17 110/66   17 122/70   17 118/64    Weight from Last 3 Encounters:   17 139 lb (63 kg)   17 135 lb (61.2 kg)   17 132 lb (59.9 kg)              We Performed the Following     FLU VAC, SPLIT VIRUS IM > 3 YO (QUADRIVALENT) [47831]     Vaccine Administration, Initial [30447]        Primary Care Provider    None       No address on file        Equal Access to Services     [facility]: Hadii aad ku hadasho Soomaali, waaxda luqadaha, qaybta kaalmada adeegyada, sofiya angeles . So [facility] [phone].    ATENCIÓN: Si habla español, tiene a anton disposición servicios gratuitos de asistencia lingüística. Llame al [phone].    We comply with applicable federal civil rights laws and [place] laws. We do not discriminate on the basis of race, color, national origin, age, disability sex, sexual orientation or gender identity.            Thank you!     Thank you for choosing [facility] [place]  for your care. Our goal is always to provide you with excellent care. Hearing back from our patients is one way we can continue to improve our services. Please take a few minutes to complete the written survey that you may receive in the mail after your visit with us. Thank you!             Your Updated Medication List - Protect others around you: Learn how to safely use, store and throw away your medicines at  www.disposemymeds.org.          This list is accurate as of: 9/13/17  9:07 AM.  Always use your most recent med list.                   Brand Name Dispense Instructions for use Diagnosis    acetaminophen 500 MG tablet    TYLENOL    60 tablet    Take 1-2 tablets (500-1,000 mg) by mouth every 6 hours as needed for mild pain        prenatal multivitamin plus iron 27-0.8 MG Tabs per tablet      Take 1 tablet by mouth daily

## 2017-09-13 NOTE — PATIENT INSTRUCTIONS
Flu shot today.  Return to clinic in 1 week.  If you think your bag of water is broken; have bleeding like a period; think you are in labor; or are worried about your baby's movement, please call the labor and delivery unit at [phone].

## 2017-09-16 ENCOUNTER — HOSPITAL ENCOUNTER (EMERGENCY)
Facility: HOSPITAL | Age: 22
End: 2017-09-16
Payer: COMMERCIAL

## 2017-09-16 ENCOUNTER — HOSPITAL ENCOUNTER (OUTPATIENT)
Facility: HOSPITAL | Age: 22
Discharge: HOME | End: 2017-09-16
Attending: SPECIALIST | Admitting: SPECIALIST
Payer: COMMERCIAL

## 2017-09-16 VITALS
TEMPERATURE: 97.6 F | OXYGEN SATURATION: 99 % | DIASTOLIC BLOOD PRESSURE: 72 MMHG | SYSTOLIC BLOOD PRESSURE: 119 MMHG | RESPIRATION RATE: 16 BRPM

## 2017-09-16 DIAGNOSIS — R30.0 DYSURIA: Primary | ICD-10-CM

## 2017-09-16 PROBLEM — Z36.89 ENCOUNTER FOR TRIAGE IN PREGNANT PATIENT: Status: ACTIVE | Noted: 2017-09-16

## 2017-09-16 LAB
ALBUMIN UR-MCNC: NEGATIVE MG/DL
APPEARANCE UR: CLEAR
BACTERIA #/AREA URNS HPF: ABNORMAL /HPF
BILIRUB UR QL STRIP: NEGATIVE
COLOR UR AUTO: ABNORMAL
FIBRONECTIN FETAL VAG QL: POSITIVE
GLUCOSE UR STRIP-MCNC: NEGATIVE MG/DL
HGB UR QL STRIP: ABNORMAL
KETONES UR STRIP-MCNC: NEGATIVE MG/DL
LEUKOCYTE ESTERASE UR QL STRIP: ABNORMAL
MUCOUS THREADS #/AREA URNS LPF: PRESENT /LPF
NITRATE UR QL: NEGATIVE
PH UR STRIP: 6.5 PH (ref 4.7–8)
RBC #/AREA URNS AUTO: 16 /HPF (ref 0–2)
SOURCE: ABNORMAL
SP GR UR STRIP: 1.01 (ref 1–1.03)
SQUAMOUS #/AREA URNS AUTO: 2 /HPF (ref 0–1)
UROBILINOGEN UR STRIP-MCNC: NORMAL MG/DL (ref 0–2)
WBC #/AREA URNS AUTO: 22 /HPF (ref 0–2)

## 2017-09-16 PROCEDURE — 96372 THER/PROPH/DIAG INJ SC/IM: CPT

## 2017-09-16 PROCEDURE — 59025 FETAL NON-STRESS TEST: CPT | Mod: 26 | Performed by: SPECIALIST

## 2017-09-16 PROCEDURE — 81001 URINALYSIS AUTO W/SCOPE: CPT | Performed by: SPECIALIST

## 2017-09-16 PROCEDURE — 59025 FETAL NON-STRESS TEST: CPT

## 2017-09-16 PROCEDURE — 82731 ASSAY OF FETAL FIBRONECTIN: CPT | Performed by: SPECIALIST

## 2017-09-16 PROCEDURE — 99213 OFFICE O/P EST LOW 20 MIN: CPT | Mod: 25

## 2017-09-16 PROCEDURE — 25000132 ZZH RX MED GY IP 250 OP 250 PS 637: Performed by: SPECIALIST

## 2017-09-16 PROCEDURE — 25000128 H RX IP 250 OP 636: Performed by: SPECIALIST

## 2017-09-16 PROCEDURE — 87086 URINE CULTURE/COLONY COUNT: CPT | Performed by: SPECIALIST

## 2017-09-16 RX ORDER — TERBUTALINE SULFATE 1 MG/ML
0.25 INJECTION, SOLUTION SUBCUTANEOUS ONCE
Status: COMPLETED | OUTPATIENT
Start: 2017-09-16 | End: 2017-09-16

## 2017-09-16 RX ORDER — NITROFURANTOIN 25; 75 MG/1; MG/1
100 CAPSULE ORAL EVERY 12 HOURS SCHEDULED
Status: DISCONTINUED | OUTPATIENT
Start: 2017-09-16 | End: 2017-09-16 | Stop reason: HOSPADM

## 2017-09-16 RX ORDER — NITROFURANTOIN 25; 75 MG/1; MG/1
100 CAPSULE ORAL 2 TIMES DAILY
Qty: 20 CAPSULE | Refills: 0 | Status: SHIPPED | OUTPATIENT
Start: 2017-09-16 | End: 2017-09-26

## 2017-09-16 RX ADMIN — NITROFURANTOIN (MONOHYDRATE/MACROCRYSTALS) 100 MG: 75; 25 CAPSULE ORAL at 12:10

## 2017-09-16 RX ADMIN — TERBUTALINE SULFATE 0.25 MG: 1 INJECTION, SOLUTION SUBCUTANEOUS at 12:10

## 2017-09-16 NOTE — DISCHARGE INSTRUCTIONS
Pt to remain out of work until after Dr. Mccray with Dr. Mendez Tuesday 9/19/17  Pushing Fluids Daily   Discharge Instructions for Undelivered Patients    Diet:  * Drink 8 to 12 glasses of liquids (milk, juice, water) every day  * You may eat meals and snacks.    Activity:  * Count fetal kicks every day.  * Call your doctor if your baby is moving less than usual.    Call your provider if you notice:  * Swelling in your face or increased swelling in your hands or legs.  * Headaches that are not relieved by Tylenol (acetaminophen).  * Changes in your vision (blurring; seeing spots or stars).  * Nausea (sick to your stomach) and vomiting (throwing up).  * Weight gain of 5 pounds per week.  * Heartburn that doesn't go away.  * Signs of bladder infection: Pain when you urinate (use the toilet), needing to go more often or more urgently.  * The bag of méndez (membrane) breaks, or you notice leaking in your underwear.  * Bright red blood in your underwear.  * Abdominal (lower belly) or stomach pain.  * For first baby: Contractions (tightenings) less than 5 minutes apart for one hour or more.  * Increase or change in vaginal discharge (note the color and amount).      [facility]: [phone]

## 2017-09-16 NOTE — IP AVS SNAPSHOT
MRN:[id number]                      After Visit Summary   9/16/2017    Marisol Monroy    MRN: [id number]           Thank you!     Thank you for choosing [facility] for your care. Our goal is always to provide you with excellent care. Hearing back from our patients is one way we can continue to improve our services. Please take a few minutes to complete the written survey that you may receive in the mail after you visit with us. Thank you!        Patient Information     Date Of Birth          1995        About your hospital stay     You were admitted on:  September 16, 2017 You last received care in the:  [facility]    You were discharged on:  September 16, 2017       Who to Call     For medical emergencies, please call 911.  For non-urgent questions about your medical care, please call your primary care provider or clinic, None          Attending Provider     Provider Specialty    Rashi Rain MD OB/Gyn       Primary Care Provider    None      Your next 10 appointments already scheduled     Sep 19, 2017  9:50 AM CDT   (Arrive by 9:35 AM)   ESTABLISHED PRENATAL with Digna Mendez MD   [facility] ([facility] )    [address] [place] [address]   [phone]              Further instructions from your care team         Pt to remain out of work until after  Appt with Dr. Mendez Tuesday 9/19/17  Pushing Fluids Daily   Discharge Instructions for Undelivered Patients    Diet:  * Drink 8 to 12 glasses of liquids (milk, juice, water) every day  * You may eat meals and snacks.    Activity:  * Count fetal kicks every day.  * Call your doctor if your baby is moving less than usual.    Call your provider if you notice:  * Swelling in your face or increased swelling in your hands or legs.  * Headaches that are not relieved by Tylenol (acetaminophen).  * Changes in your vision (blurring; seeing spots or stars).  * Nausea (sick to your stomach) and  "vomiting (throwing up).  * Weight gain of 5 pounds per week.  * Heartburn that doesn't go away.  * Signs of bladder infection: Pain when you urinate (use the toilet), needing to go more often or more urgently.  * The bag of méndez (membrane) breaks, or you notice leaking in your underwear.  * Bright red blood in your underwear.  * Abdominal (lower belly) or stomach pain.  * For first baby: Contractions (tightenings) less than 5 minutes apart for one hour or more.  * Increase or change in vaginal discharge (note the color and amount).      [facility]: [phone]        Pending Results     Date and Time Order Name Status Description    2017 1120 Urine Culture Aerobic Bacterial In process             Admission Information     Date & Time Provider Department Dept. Phone    2017 Rashi Rain MD [facility] [phone]      Your Vitals Were     Blood Pressure Temperature Respirations Pulse Oximetry          119/72 97.6  F (36.4  C) (Oral) 16 99%        MyChart Information     [employer] lets you send messages to your doctor, view your test results, renew your prescriptions, schedule appointments and more. To sign up, go to [email or web address] . Click on \"Log in\" on the left side of the screen, which will take you to the Welcome page. Then click on \"Sign up Now\" on the right side of the page.     You will be asked to enter the access code listed below, as well as some personal information. Please follow the directions to create your username and password.     Your access code is: [email or web address]  Expires: 2017 11:30 AM     Your access code will  in 90 days. If you need help or a new code, please call your [place] clinic or [phone].        Care EveryWhere ID     This is your Care EveryWhere ID. This could be used by other organizations to access your [place] medical records  [id number]        Equal Access to Services     NAN RICHMOND AH: Alexandru browne " Soomaali, waaxda luqadaha, qaybta kaalmada adeegmathewda, sofiya arambulaariel yasmeen. So [facility] [phone].    ATENCIÓN: Si adri jiménez, tiene a anton disposición servicios gratuitos de asistencia lingüística. Jennifer al [phone].    We comply with applicable federal civil rights laws and [place] laws. We do not discriminate on the basis of race, color, national origin, age, disability sex, sexual orientation or gender identity.               Review of your medicines      UNREVIEWED medicines. Ask your doctor about these medicines        Dose / Directions    acetaminophen 500 MG tablet   Commonly known as:  TYLENOL        Dose:  500-1000 mg   Take 1-2 tablets (500-1,000 mg) by mouth every 6 hours as needed for mild pain   Quantity:  60 tablet   Refills:  0       prenatal multivitamin plus iron 27-0.8 MG Tabs per tablet        Dose:  1 tablet   Take 1 tablet by mouth daily   Refills:  0         START taking        Dose / Directions    nitroFURantoin (macrocrystal-monohydrate) 100 MG capsule   Commonly known as:  MACROBID   Used for:  Dysuria        Dose:  100 mg   Take 1 capsule (100 mg) by mouth 2 times daily for 10 days   Quantity:  20 capsule   Refills:  0            Where to get your medicines      These medications were sent to [id number] - [place], [place] - [address] [address]  [address] [address], [place] [address]     Phone:  [phone]     nitroFURantoin (macrocrystal-monohydrate) 100 MG capsule                Protect others around you: Learn how to safely use, store and throw away your medicines at www.disposemymeds.org.             Medication List: This is a list of all your medications and when to take them. Check marks below indicate your daily home schedule. Keep this list as a reference.      Medications           Morning Afternoon Evening Bedtime As Needed    acetaminophen 500 MG tablet   Commonly known as:  TYLENOL   Take 1-2 tablets (500-1,000 mg) by mouth every 6 hours as needed for mild  pain                                nitroFURantoin (macrocrystal-monohydrate) 100 MG capsule   Commonly known as:  MACROBID   Take 1 capsule (100 mg) by mouth 2 times daily for 10 days   Last time this was given:  100 mg on 9/16/2017 12:10 PM                                prenatal multivitamin plus iron 27-0.8 MG Tabs per tablet   Take 1 tablet by mouth daily

## 2017-09-16 NOTE — PLAN OF CARE
Discharge instructions given and reviewed.  AVS signed.  Pt knows to rest and nothing in the vagina until she sees dr. Mendez on Tuesday. Also off of work till she see Dr. Mendez.  Pt discharged to home, out to car via wheelchair.

## 2017-09-16 NOTE — IP AVS SNAPSHOT
[facility]    [address] [address]    Phone:  [phone]    Fax:  [phone]                                       After Visit Summary   9/16/2017    Marisol Monroy    MRN: [id number]           After Visit Summary Signature Page     I have received my discharge instructions, and my questions have been answered. I have discussed any challenges I see with this plan with the nurse or doctor.    ..........................................................................................................................................  Patient/Patient Representative Signature      ..........................................................................................................................................  Patient Representative Print Name and Relationship to Patient    ..................................................               ................................................  Date                                            Time    ..........................................................................................................................................  Reviewed by Signature/Title    ...................................................              ..............................................  Date                                                            Time

## 2017-09-16 NOTE — PLAN OF CARE
OB Triage Note  Marisol Monroy  MRN: [id number]  Gestational Age: 34w3d      Marisol Monroy presents for vaginal spotting (sign/symptom/concern).      Unable to feel contractions.  Rates pain at 0/10.  Bleeding: spotting  began at 8:00.  Denies LOF.   Dr. Rain notified of arrival and condition.  Oriented patient to surroundings. Call light within reach.     FHT: 120  NST Start Time:1020  NST Stop Time:1040  NST: Reactive .  Uterine Assessment:Contractions: frequency q 4-5 minutes of not felt by patient quality.    Plan:  -Initial NST, then fetal/uterine monitoring per MD/patient plan.  -Sterile vaginal exam/sterile speculum exam.  -Nursing education on hydration,  labor, vaginal bleeding provided.

## 2017-09-16 NOTE — PLAN OF CARE
Pt was at work and had some spotting, so came in,  EFM on shows contractions every 4 min that she doesn't really feel.  Pt is drinking fluids, no spotting on tissue now when she wipes.

## 2017-09-18 LAB
BACTERIA SPEC CULT: ABNORMAL
SPECIMEN SOURCE: ABNORMAL

## 2017-09-19 ENCOUNTER — PRENATAL OFFICE VISIT (OUTPATIENT)
Dept: OBGYN | Facility: OTHER | Age: 22
End: 2017-09-19
Attending: OBSTETRICS & GYNECOLOGY
Payer: COMMERCIAL

## 2017-09-19 VITALS — DIASTOLIC BLOOD PRESSURE: 68 MMHG | WEIGHT: 142 LBS | BODY MASS INDEX: 24.37 KG/M2 | SYSTOLIC BLOOD PRESSURE: 112 MMHG

## 2017-09-19 DIAGNOSIS — Z34.80 SUPERVISION OF OTHER NORMAL PREGNANCY, ANTEPARTUM: Primary | ICD-10-CM

## 2017-09-19 PROCEDURE — 99207 ZZC COMPLICATED OB VISIT: CPT | Performed by: OBSTETRICS & GYNECOLOGY

## 2017-09-19 NOTE — MR AVS SNAPSHOT
"              After Visit Summary   2017    Marisol Monroy    MRN: [id number]           Patient Information     Date Of Birth          1995        Visit Information        Provider Department      2017 9:50 AM Digna Mendez MD [facility]        Today's Diagnoses     Supervision of other normal pregnancy, antepartum    -  1      Care Instructions    Off work until further notice.  Return to clinic in 1 week.            Follow-ups after your visit        Who to contact     If you have questions or need follow up information about today's clinic visit or your schedule please contact [facility] directly at [phone].  Normal or non-critical lab and imaging results will be communicated to you by TRAKLOKhart, letter or phone within 4 business days after the clinic has received the results. If you do not hear from us within 7 days, please contact the clinic through TRAKLOKhart or phone. If you have a critical or abnormal lab result, we will notify you by phone as soon as possible.  Submit refill requests through DeepFlex or call your pharmacy and they will forward the refill request to us. Please allow 3 business days for your refill to be completed.          Additional Information About Your Visit        MyChart Information     [employer] lets you send messages to your doctor, view your test results, renew your prescriptions, schedule appointments and more. To sign up, go to [email or web address] . Click on \"Log in\" on the left side of the screen, which will take you to the Welcome page. Then click on \"Sign up Now\" on the right side of the page.     You will be asked to enter the access code listed below, as well as some personal information. Please follow the directions to create your username and password.     Your access code is: [email or web address]  Expires: 2017 11:30 AM     Your access code will  in 90 days. If you need help or a new code, please call your [place] " clinic or [phone].        Care EveryWhere ID     This is your Care EveryWhere ID. This could be used by other organizations to access your [place] medical records  [id number]        Your Vitals Were     BMI (Body Mass Index)                   24.37 kg/m2            Blood Pressure from Last 3 Encounters:   09/19/17 112/68   09/16/17 119/72   09/13/17 110/66    Weight from Last 3 Encounters:   09/19/17 142 lb (64.4 kg)   09/13/17 139 lb (63 kg)   08/22/17 135 lb (61.2 kg)              Today, you had the following     No orders found for display       Primary Care Provider    None       No address on file        Equal Access to Services     NAN RICHMOND : Alexandru Snell, candelario arnold, triston streetmaoleg kraus, sofiya arana. So [facility] [phone].    ATENCIÓN: Si habla español, tiene a anton disposición servicios gratuitos de asistencia lingüística. Llame al [phone].    We comply with applicable federal civil rights laws and [place] laws. We do not discriminate on the basis of race, color, national origin, age, disability sex, sexual orientation or gender identity.            Thank you!     Thank you for choosing [facility]  [facility] your [facility]. Our goal is always to provide you with excellent care. Hearing back from our patients is one way we can continue to improve our services. Please take a few minutes to complete the written survey that you may receive in the mail after your visit with us. Thank you!             Your Updated Medication List - Protect others around you: Learn how to safely use, store and throw away your medicines at www.disposemymeds.org.          This list is accurate as of: 9/19/17 11:23 AM.  Always use your most recent med list.                   Brand Name Dispense Instructions for use Diagnosis    acetaminophen 500 MG tablet    TYLENOL    60 tablet    Take 1-2 tablets (500-1,000 mg) by mouth every 6 hours as needed for mild pain         nitroFURantoin (macrocrystal-monohydrate) 100 MG capsule    MACROBID    20 capsule    Take 1 capsule (100 mg) by mouth 2 times daily for 10 days    Dysuria       prenatal multivitamin plus iron 27-0.8 MG Tabs per tablet      Take 1 tablet by mouth daily

## 2017-09-19 NOTE — PROGRESS NOTES
"Hospital visit discussed at length  Myths dispeled  FFN discussed  Note to patient \"off work until further notice\" for discomfort  "

## 2017-09-26 ENCOUNTER — PRENATAL OFFICE VISIT (OUTPATIENT)
Dept: OBGYN | Facility: OTHER | Age: 22
End: 2017-09-26
Attending: OBSTETRICS & GYNECOLOGY
Payer: COMMERCIAL

## 2017-09-26 VITALS — BODY MASS INDEX: 24.37 KG/M2 | SYSTOLIC BLOOD PRESSURE: 114 MMHG | WEIGHT: 142 LBS | DIASTOLIC BLOOD PRESSURE: 70 MMHG

## 2017-09-26 DIAGNOSIS — Z34.80 SUPERVISION OF OTHER NORMAL PREGNANCY, ANTEPARTUM: ICD-10-CM

## 2017-09-26 DIAGNOSIS — O09.33 INSUFFICIENT PRENATAL CARE IN THIRD TRIMESTER: Primary | ICD-10-CM

## 2017-09-26 PROBLEM — Z36.89 ENCOUNTER FOR TRIAGE IN PREGNANT PATIENT: Status: RESOLVED | Noted: 2017-09-16 | Resolved: 2017-09-26

## 2017-09-26 LAB
ERYTHROCYTE [DISTWIDTH] IN BLOOD BY AUTOMATED COUNT: 12.2 % (ref 10–15)
HCT VFR BLD AUTO: 32.2 % (ref 35–47)
HGB BLD-MCNC: 11.2 G/DL (ref 11.7–15.7)
MCH RBC QN AUTO: 31.3 PG (ref 26.5–33)
MCHC RBC AUTO-ENTMCNC: 34.8 G/DL (ref 31.5–36.5)
MCV RBC AUTO: 90 FL (ref 78–100)
PLATELET # BLD AUTO: 241 10E9/L (ref 150–450)
RBC # BLD AUTO: 3.58 10E12/L (ref 3.8–5.2)
WBC # BLD AUTO: 8.9 10E9/L (ref 4–11)

## 2017-09-26 PROCEDURE — 85027 COMPLETE CBC AUTOMATED: CPT | Performed by: OBSTETRICS & GYNECOLOGY

## 2017-09-26 PROCEDURE — 99207 ZZC PRENATAL VISIT: CPT | Mod: 25 | Performed by: OBSTETRICS & GYNECOLOGY

## 2017-09-26 PROCEDURE — 76815 OB US LIMITED FETUS(S): CPT | Performed by: OBSTETRICS & GYNECOLOGY

## 2017-09-26 PROCEDURE — 87653 STREP B DNA AMP PROBE: CPT | Performed by: OBSTETRICS & GYNECOLOGY

## 2017-09-26 ASSESSMENT — PAIN SCALES - GENERAL: PAINLEVEL: NO PAIN (0)

## 2017-09-26 NOTE — MR AVS SNAPSHOT
After Visit Summary   9/26/2017    Marisol Monroy    MRN: [id number]           Patient Information     Date Of Birth          1995        Visit Information        Provider Department      9/26/2017 3:30 PM Digna Mendez MD [facility] [place]        Today's Diagnoses     Insufficient prenatal care in third trimester    -  1    Supervision of other normal pregnancy, antepartum          Care Instructions    Lab today.  Return to clinic in 1 week.  If you think your bag of water is broken; have bleeding like a period; think you are in labor; or are worried about your baby's movement, please call the labor and delivery unit at [phone].            Follow-ups after your visit        Your next 10 appointments already scheduled     Oct 03, 2017  2:10 PM CDT   (Arrive by 1:55 PM)   ESTABLISHED PRENATAL with Digna Mendez MD   [facility] [place] ([facility] )    [address]  [place] [address] [phone]              Who to contact     If you have questions or need follow up information about today's clinic visit or your schedule please contact [facility] directly at [phone].  Normal or non-critical lab and imaging results will be communicated to you by MyChart, letter or phone within 4 business days after the clinic has received the results. If you do not hear from us within 7 days, please contact the clinic through Edaytownhart or phone. If you have a critical or abnormal lab result, we will notify you by phone as soon as possible.  Submit refill requests through Simtrol or call your pharmacy and they will forward the refill request to us. Please allow 3 business days for your refill to be completed.          Additional Information About Your Visit        MyChart Information     [employer] lets you send messages to your doctor, view your test results, renew your prescriptions, schedule appointments and more. To sign up, go to  [email or web address] . Click on \"Log in\" on the left side of the screen, which will take you to the Welcome page. Then click on \"Sign up Now\" on the right side of the page.     You will be asked to enter the access code listed below, as well as some personal information. Please follow the directions to create your username and password.     Your access code is: [email or web address]  Expires: 2017 11:30 AM     Your access code will  in 90 days. If you need help or a new code, please call your [place] clinic or [phone].        Care EveryWhere ID     This is your Care EveryWhere ID. This could be used by other organizations to access your [place] medical records  [id number]        Your Vitals Were     BMI (Body Mass Index)                   24.37 kg/m2            Blood Pressure from Last 3 Encounters:   17 114/70   17 112/68   17 119/72    Weight from Last 3 Encounters:   17 142 lb (64.4 kg)   17 142 lb (64.4 kg)   17 139 lb (63 kg)              We Performed the Following     CBC with platelets     Group B strep PCR        Primary Care Provider    None Specified       No primary provider on file.        Equal Access to Services     NAN RICHMOND : Haddave Snell, waaxda luqadaha, qaybta kaalmada adeyoyada, sofiya angeles . So [facility] [phone].    ATENCIÓN: Si habla español, tiene a anton disposición servicios gratuitos de asistencia lingüística. Llame al [phone].    We comply with applicable federal civil rights laws and [place] laws. We do not discriminate on the basis of race, color, national origin, age, disability sex, sexual orientation or gender identity.            Thank you!     Thank you for choosing [facility]  for your care. Our goal is always to provide you with excellent care. Hearing back from our patients is one way we can continue to improve our services. Please take a few minutes to complete " the written survey that you may receive in the mail after your visit with us. Thank you!             Your Updated Medication List - Protect others around you: Learn how to safely use, store and throw away your medicines at www.disposemymeds.org.          This list is accurate as of: 9/26/17  4:32 PM.  Always use your most recent med list.                   Brand Name Dispense Instructions for use Diagnosis    acetaminophen 500 MG tablet    TYLENOL    60 tablet    Take 1-2 tablets (500-1,000 mg) by mouth every 6 hours as needed for mild pain        prenatal multivitamin plus iron 27-0.8 MG Tabs per tablet      Take 1 tablet by mouth daily

## 2017-09-26 NOTE — PROGRESS NOTES
36 Week Visit    Patient education provided on the following:  Baby-led feeding  Exclusivity of breastfeeding for the first 6 months  The importance of exclusive breastfeeding  Non-pharmalogical pain relief methods for labor  Frequency of feeding in relation to establishing a milk supply  Continuation of breastfeeding after introduction of appropriate complimentary foods    We reviewed the [place] [employer] Prenatal Toolkit in the [facility] Resource Book.  Patient questions and concerns addressed and reviewed. Support and encouragement provided.    MARIELENA Snyder,Brunilda  rto 1 wk

## 2017-09-26 NOTE — PATIENT INSTRUCTIONS
Lab today.  Return to clinic in 1 week.  If you think your bag of water is broken; have bleeding like a period; think you are in labor; or are worried about your baby's movement, please call the labor and delivery unit at [phone].

## 2017-09-26 NOTE — NURSING NOTE
"Chief Complaint   Patient presents with     Prenatal Care       Initial /70  Wt 142 lb (64.4 kg)  BMI 24.37 kg/m2 Estimated body mass index is 24.37 kg/(m^2) as calculated from the following:    Height as of 4/3/17: 5' 4\" (1.626 m).    Weight as of this encounter: 142 lb (64.4 kg).  Medication Reconciliation: complete     Jacklyn Lai      "

## 2017-09-27 LAB
GP B STREP DNA SPEC QL NAA+PROBE: NEGATIVE
SPECIMEN SOURCE: NORMAL

## 2017-10-03 ENCOUNTER — PRENATAL OFFICE VISIT (OUTPATIENT)
Dept: OBGYN | Facility: OTHER | Age: 22
End: 2017-10-03
Attending: OBSTETRICS & GYNECOLOGY
Payer: COMMERCIAL

## 2017-10-03 VITALS — DIASTOLIC BLOOD PRESSURE: 66 MMHG | WEIGHT: 141 LBS | BODY MASS INDEX: 24.2 KG/M2 | SYSTOLIC BLOOD PRESSURE: 112 MMHG

## 2017-10-03 DIAGNOSIS — Z34.80 SUPERVISION OF OTHER NORMAL PREGNANCY, ANTEPARTUM: Primary | ICD-10-CM

## 2017-10-03 PROCEDURE — 99207 ZZC PRENATAL VISIT: CPT | Performed by: OBSTETRICS & GYNECOLOGY

## 2017-10-03 NOTE — MR AVS SNAPSHOT
"              After Visit Summary   10/3/2017    Marisol Monroy    MRN: [id number]           Patient Information     Date Of Birth          1995        Visit Information        Provider Department      10/3/2017 2:10 PM Digna Mendez MD [facility]        Today's Diagnoses     Supervision of other normal pregnancy, antepartum    -  1      Care Instructions    Return to clinic in 1 week.  If you think your bag of water is broken; have bleeding like a period; think you are in labor; or are worried about your baby's movement, please call the labor and delivery unit at [phone].            Follow-ups after your visit        Your next 10 appointments already scheduled     Oct 10, 2017  3:10 PM CDT   (Arrive by 2:55 PM)   ESTABLISHED PRENATAL with Digna Mendez MD   [facility] [place] ([facility] [place] )    [address]  [place] [id number] [phone]              Who to contact     If you have questions or need follow up information about today's clinic visit or your schedule please contact [facility] directly at [phone].  Normal or non-critical lab and imaging results will be communicated to you by GroundLinkhart, letter or phone within 4 business days after the clinic has received the results. If you do not hear from us within 7 days, please contact the clinic through GroundLinkhart or phone. If you have a critical or abnormal lab result, we will notify you by phone as soon as possible.  Submit refill requests through [employer] or call your pharmacy and they will forward the refill request to us. Please allow 3 business days for your refill to be completed.          Additional Information About Your Visit        MyChart Information     [employer] lets you send messages to your doctor, view your test results, renew your prescriptions, schedule appointments and more. To sign up, go to [email or web address] . Click on \"Log in\" on the left side of the screen, which " "will take you to the Welcome page. Then click on \"Sign up Now\" on the right side of the page.     You will be asked to enter the access code listed below, as well as some personal information. Please follow the directions to create your username and password.     Your access code is: [email or web address]  Expires: 2017 11:30 AM     Your access code will  in 90 days. If you need help or a new code, please call your [place] clinic or [phone].        Care EveryWhere ID     This is your Care EveryWhere ID. This could be used by other organizations to access your [place] medical records  [id number]        Your Vitals Were     BMI (Body Mass Index)                   24.2 kg/m2            Blood Pressure from Last 3 Encounters:   10/03/17 112/66   17 114/70   17 112/68    Weight from Last 3 Encounters:   10/03/17 141 lb (64 kg)   17 142 lb (64.4 kg)   17 142 lb (64.4 kg)              Today, you had the following     No orders found for display       Primary Care Provider    None Specified       No primary provider on file.        Equal Access to Services     [facility]: Hadii amy Snell, wabuckda catalina, qaybta kaalmada nayana, sofiya angeles . So [facility] [phone].    ATENCIÓN: Si habla español, tiene a anton disposición servicios gratuitos de asistencia lingüística. Llame al [phone].    We comply with applicable federal civil rights laws and [place] laws. We do not discriminate on the basis of race, color, national origin, age, disability, sex, sexual orientation, or gender identity.            Thank you!     Thank you for choosing [facility] [place]  for your care. Our goal is always to provide you with excellent care. Hearing back from our patients is one way we can continue to improve our services. Please take a few minutes to complete the written survey that you may receive in the mail after your visit with us. Thank you!      "        Your Updated Medication List - Protect others around you: Learn how to safely use, store and throw away your medicines at www.disposemymeds.org.          This list is accurate as of: 10/3/17 11:59 PM.  Always use your most recent med list.                   Brand Name Dispense Instructions for use Diagnosis    acetaminophen 500 MG tablet    TYLENOL    60 tablet    Take 1-2 tablets (500-1,000 mg) by mouth every 6 hours as needed for mild pain        methotrexate 50 MG/2ML injection CHEMO     3.14 mL    Inject 3.14 mLs (78.5 mg) into the muscle once for 1 dose    Abnormal pregnancy, unspecified trimester       prenatal multivitamin plus iron 27-0.8 MG Tabs per tablet      Take 1 tablet by mouth daily

## 2017-10-04 NOTE — PATIENT INSTRUCTIONS
Return to clinic in 1 week.  If you think your bag of water is broken; have bleeding like a period; think you are in labor; or are worried about your baby's movement, please call the labor and delivery unit at [phone].

## 2017-10-10 ENCOUNTER — PRENATAL OFFICE VISIT (OUTPATIENT)
Dept: OBGYN | Facility: OTHER | Age: 22
End: 2017-10-10
Attending: OBSTETRICS & GYNECOLOGY
Payer: COMMERCIAL

## 2017-10-10 VITALS — SYSTOLIC BLOOD PRESSURE: 112 MMHG | BODY MASS INDEX: 25.06 KG/M2 | WEIGHT: 146 LBS | DIASTOLIC BLOOD PRESSURE: 70 MMHG

## 2017-10-10 DIAGNOSIS — Z34.80 SUPERVISION OF OTHER NORMAL PREGNANCY, ANTEPARTUM: Primary | ICD-10-CM

## 2017-10-10 PROCEDURE — 99207 ZZC PRENATAL VISIT: CPT | Performed by: OBSTETRICS & GYNECOLOGY

## 2017-10-10 NOTE — PATIENT INSTRUCTIONS
Return to clinic in 1 week.  If you think your bag of water is broken; have bleeding like a period; think you are in labor; or are worried about your baby's movement, please call the labor and delivery unit at [phone].

## 2017-10-10 NOTE — MR AVS SNAPSHOT
After Visit Summary   10/10/2017    Marisol Monroy    MRN: [id number]           Patient Information     Date Of Birth          1995        Visit Information        Provider Department      10/10/2017 3:10 PM Digna Mendez MD [facility] [place]        Today's Diagnoses     Supervision of other normal pregnancy, antepartum    -  1      Care Instructions    Return to clinic in 1 week.  If you think your bag of water is broken; have bleeding like a period; think you are in labor; or are worried about your baby's movement, please call the labor and delivery unit at [phone].            Follow-ups after your visit        Your next 10 appointments already scheduled     Oct 10, 2017  3:10 PM CDT   (Arrive by 2:55 PM)   ESTABLISHED PRENATAL with Digna Mendez MD   [facility] [place] ([facility] )    [address] [place] [address]   [phone]            Oct 17, 2017  3:20 PM CDT   (Arrive by 3:05 PM)   ESTABLISHED PRENATAL with Digna Mendez MD   [facility] ([facility] )    [address]  [place] [address]   [phone]              Who to contact     If you have questions or need follow up information about today's clinic visit or your schedule please contact [facility] directly at [phone].  Normal or non-critical lab and imaging results will be communicated to you by MyChart, letter or phone within 4 business days after the clinic has received the results. If you do not hear from us within 7 days, please contact the clinic through MyChart or phone. If you have a critical or abnormal lab result, we will notify you by phone as soon as possible.  Submit refill requests through [employer] or call your pharmacy and they will forward the refill request to us. Please allow 3 business days for your refill to be completed.          Additional Information About Your Visit        MyChart Information      "[employer] lets you send messages to your doctor, view your test results, renew your prescriptions, schedule appointments and more. To sign up, go to [email or web address] . Click on \"Log in\" on the left side of the screen, which will take you to the Welcome page. Then click on \"Sign up Now\" on the right side of the page.     You will be asked to enter the access code listed below, as well as some personal information. Please follow the directions to create your username and password.     Your access code is: [email or web address]  Expires: 2017 11:30 AM     Your access code will  in 90 days. If you need help or a new code, please call your [place] clinic or [phone].        Care EveryWhere ID     This is your Care EveryWhere ID. This could be used by other organizations to access your [place] medical records  [id number]        Your Vitals Were     BMI (Body Mass Index)                   25.06 kg/m2            Blood Pressure from Last 3 Encounters:   10/10/17 112/70   10/03/17 112/66   17 114/70    Weight from Last 3 Encounters:   10/10/17 146 lb (66.2 kg)   10/03/17 141 lb (64 kg)   17 142 lb (64.4 kg)              Today, you had the following     No orders found for display       Primary Care Provider    None Specified       No primary provider on file.        Equal Access to Services     [facility]: Hadii amy ku hadasho Sovishnuali, waaxda luqadaha, qaybta kaalmada adeyoyada, sofiya angeles . So [facility] [phone].    ATENCIÓN: Si habla español, tiene a anton disposición servicios gratuitos de asistencia lingüística. Llame al [phone].    We comply with applicable federal civil rights laws and [place] laws. We do not discriminate on the basis of race, color, national origin, age, disability, sex, sexual orientation, or gender identity.            Thank you!     Thank you for choosing [facility]  [facility] your [facility]. Our goal is always to provide you with " excellent care. Hearing back from our patients is one way we can continue to improve our services. Please take a few minutes to complete the written survey that you may receive in the mail after your visit with us. Thank you!             Your Updated Medication List - Protect others around you: Learn how to safely use, store and throw away your medicines at www.disposemymeds.org.          This list is accurate as of: 10/10/17  3:05 PM.  Always use your most recent med list.                   Brand Name Dispense Instructions for use Diagnosis    acetaminophen 500 MG tablet    TYLENOL    60 tablet    Take 1-2 tablets (500-1,000 mg) by mouth every 6 hours as needed for mild pain        prenatal multivitamin plus iron 27-0.8 MG Tabs per tablet      Take 1 tablet by mouth daily

## 2017-10-17 ENCOUNTER — PRENATAL OFFICE VISIT (OUTPATIENT)
Dept: OBGYN | Facility: OTHER | Age: 22
End: 2017-10-17
Attending: OBSTETRICS & GYNECOLOGY
Payer: COMMERCIAL

## 2017-10-17 VITALS — SYSTOLIC BLOOD PRESSURE: 110 MMHG | DIASTOLIC BLOOD PRESSURE: 64 MMHG | BODY MASS INDEX: 24.89 KG/M2 | WEIGHT: 145 LBS

## 2017-10-17 DIAGNOSIS — O09.33 INSUFFICIENT PRENATAL CARE IN THIRD TRIMESTER: Primary | ICD-10-CM

## 2017-10-17 PROCEDURE — 99207 ZZC PRENATAL VISIT: CPT | Performed by: OBSTETRICS & GYNECOLOGY

## 2017-10-17 ASSESSMENT — PAIN SCALES - GENERAL: PAINLEVEL: NO PAIN (0)

## 2017-10-17 NOTE — NURSING NOTE
"Chief Complaint   Patient presents with     Prenatal Care       Initial /64  Wt 145 lb (65.8 kg)  BMI 24.89 kg/m2 Estimated body mass index is 24.89 kg/(m^2) as calculated from the following:    Height as of 4/3/17: 5' 4\" (1.626 m).    Weight as of this encounter: 145 lb (65.8 kg).  Medication Reconciliation: complete     Jacklyn Lai      "

## 2017-10-17 NOTE — MR AVS SNAPSHOT
"              After Visit Summary   10/17/2017    Marisol Monroy    MRN: [id number]           Patient Information     Date Of Birth          1995        Visit Information        Provider Department      10/17/2017 3:20 PM Digna Mendez MD [facility] [place]        Today's Diagnoses     Insufficient prenatal care in third trimester    -  1      Care Instructions    Return to clinic in 1 week.  If you think your bag of water is broken; have bleeding like a period; think you are in labor; or are worried about your baby's movement, please call the labor and delivery unit at [phone].            Follow-ups after your visit        Your next 10 appointments already scheduled     Oct 24, 2017  3:20 PM CDT   (Arrive by 3:05 PM)   ESTABLISHED PRENATAL with Digna Mendez MD   [facility] [place] ([facility] [place] )    [address]  [place] [address] [phone]              Who to contact     If you have questions or need follow up information about today's clinic visit or your schedule please contact [facility] directly at [phone].  Normal or non-critical lab and imaging results will be communicated to you by MyChart, letter or phone within 4 business days after the clinic has received the results. If you do not hear from us within 7 days, please contact the clinic through DraftMixhart or phone. If you have a critical or abnormal lab result, we will notify you by phone as soon as possible.  Submit refill requests through [employer] or call your pharmacy and they will forward the refill request to us. Please allow 3 business days for your refill to be completed.          Additional Information About Your Visit        MyChart Information     [employer] lets you send messages to your doctor, view your test results, renew your prescriptions, schedule appointments and more. To sign up, go to [email or web address] . Click on \"Log in\" on the left side of the screen, which " "will take you to the Welcome page. Then click on \"Sign up Now\" on the right side of the page.     You will be asked to enter the access code listed below, as well as some personal information. Please follow the directions to create your username and password.     Your access code is: [email or web address]  Expires: 2017 11:30 AM     Your access code will  in 90 days. If you need help or a new code, please call your [place] clinic or [phone].        Care EveryWhere ID     This is your Care EveryWhere ID. This could be used by other organizations to access your [place] medical records  [id number]        Your Vitals Were     BMI (Body Mass Index)                   24.89 kg/m2            Blood Pressure from Last 3 Encounters:   10/17/17 110/64   10/10/17 112/70   10/03/17 112/66    Weight from Last 3 Encounters:   10/17/17 145 lb (65.8 kg)   10/10/17 146 lb (66.2 kg)   10/03/17 141 lb (64 kg)              Today, you had the following     No orders found for display       Primary Care Provider    None Specified       No primary provider on file.        Equal Access to Services     [facility]: Hadii amy Snell, wabuckda catalina, qaybta kaalmada nayana, sofiya angeles . So [facility] [phone].    ATENCIÓN: Si habla español, tiene a anton disposición servicios gratuitos de asistencia lingüística. Llame al [phone].    We comply with applicable federal civil rights laws and [place] laws. We do not discriminate on the basis of race, color, national origin, age, disability, sex, sexual orientation, or gender identity.            Thank you!     Thank you for choosing [facility] [place]  for your care. Our goal is always to provide you with excellent care. Hearing back from our patients is one way we can continue to improve our services. Please take a few minutes to complete the written survey that you may receive in the mail after your visit with us. Thank you!      "        Your Updated Medication List - Protect others around you: Learn how to safely use, store and throw away your medicines at www.disposemymeds.org.          This list is accurate as of: 10/17/17 11:59 PM.  Always use your most recent med list.                   Brand Name Dispense Instructions for use Diagnosis    acetaminophen 500 MG tablet    TYLENOL    60 tablet    Take 1-2 tablets (500-1,000 mg) by mouth every 6 hours as needed for mild pain        prenatal multivitamin plus iron 27-0.8 MG Tabs per tablet      Take 1 tablet by mouth daily

## 2017-10-19 NOTE — PATIENT INSTRUCTIONS
Return to clinic in 1 week.  If you think your bag of water is broken; have bleeding like a period; think you are in labor; or are worried about your baby's movement, please call the labor and delivery unit at [phone].

## 2017-10-24 ENCOUNTER — PRENATAL OFFICE VISIT (OUTPATIENT)
Dept: OBGYN | Facility: OTHER | Age: 22
End: 2017-10-24
Attending: OBSTETRICS & GYNECOLOGY
Payer: COMMERCIAL

## 2017-10-24 VITALS — DIASTOLIC BLOOD PRESSURE: 70 MMHG | WEIGHT: 150 LBS | BODY MASS INDEX: 25.75 KG/M2 | SYSTOLIC BLOOD PRESSURE: 118 MMHG

## 2017-10-24 DIAGNOSIS — Z34.80 SUPERVISION OF OTHER NORMAL PREGNANCY, ANTEPARTUM: Primary | ICD-10-CM

## 2017-10-24 PROCEDURE — 99207 ZZC PRENATAL VISIT: CPT | Mod: 25 | Performed by: OBSTETRICS & GYNECOLOGY

## 2017-10-24 PROCEDURE — 76815 OB US LIMITED FETUS(S): CPT | Performed by: OBSTETRICS & GYNECOLOGY

## 2017-10-24 NOTE — MR AVS SNAPSHOT
"              After Visit Summary   10/24/2017    Marisol Monroy    MRN: [id number]           Patient Information     Date Of Birth          1995        Visit Information        Provider Department      10/24/2017 3:20 PM Digna Mendez MD [facility] [place]        Today's Diagnoses     Supervision of other normal pregnancy, antepartum    -  1       Follow-ups after your visit        Your next 10 appointments already scheduled     Oct 31, 2017  3:00 PM CDT   (Arrive by 2:45 PM)   ESTABLISHED PRENATAL with Digna Mendez MD   [facility] [place] ([facility] [place] )    [address]  [place] [address]   [phone]              Who to contact     If you have questions or need follow up information about today's clinic visit or your schedule please contact [facility] directly at [phone].  Normal or non-critical lab and imaging results will be communicated to you by MyChart, letter or phone within 4 business days after the clinic has received the results. If you do not hear from us within 7 days, please contact the clinic through MyChart or phone. If you have a critical or abnormal lab result, we will notify you by phone as soon as possible.  Submit refill requests through [employer] or call your pharmacy and they will forward the refill request to us. Please allow 3 business days for your refill to be completed.          Additional Information About Your Visit        MyChart Information     [employer] lets you send messages to your doctor, view your test results, renew your prescriptions, schedule appointments and more. To sign up, go to [email or web address] . Click on \"Log in\" on the left side of the screen, which will take you to the Welcome page. Then click on \"Sign up Now\" on the right side of the page.     You will be asked to enter the access code listed below, as well as some personal information. Please follow the directions to create your username and " password.     Your access code is: [email or web address]  Expires: 2017 11:30 AM     Your access code will  in 90 days. If you need help or a new code, please call your [place] clinic or [phone].        Care EveryWhere ID     This is your Care EveryWhere ID. This could be used by other organizations to access your [place] medical records  [id number]        Your Vitals Were     BMI (Body Mass Index)                   25.75 kg/m2            Blood Pressure from Last 3 Encounters:   10/24/17 118/70   10/17/17 110/64   10/10/17 112/70    Weight from Last 3 Encounters:   10/24/17 150 lb (68 kg)   10/17/17 145 lb (65.8 kg)   10/10/17 146 lb (66.2 kg)              We Performed the Following     US OB LIMITED, 1 OR MORE FETUSES        Primary Care Provider    Physician No Ref-Primary       NO REF-PRIMARY PHYSICIAN        Equal Access to Services     [facility] : Hadii amy Snell, waaxda luleila, qaybta kaalmada adeparamjit, sofiya angeles . So [facility] [phone].    ATENCIÓN: Si habla español, tiene a anton disposición servicios gratuitos de asistencia lingüística. Llame al [phone].    We comply with applicable federal civil rights laws and [place] laws. We do not discriminate on the basis of race, color, national origin, age, disability, sex, sexual orientation, or gender identity.            Thank you!     Thank you for choosing [facility] [place]  for your care. Our goal is always to provide you with excellent care. Hearing back from our patients is one way we can continue to improve our services. Please take a few minutes to complete the written survey that you may receive in the mail after your visit with us. Thank you!             Your Updated Medication List - Protect others around you: Learn how to safely use, store and throw away your medicines at www.disposemymeds.org.          This list is accurate as of: 10/24/17  4:28 PM.  Always use your most recent med  list.                   Brand Name Dispense Instructions for use Diagnosis    acetaminophen 500 MG tablet    TYLENOL    60 tablet    Take 1-2 tablets (500-1,000 mg) by mouth every 6 hours as needed for mild pain        prenatal multivitamin plus iron 27-0.8 MG Tabs per tablet      Take 1 tablet by mouth daily

## 2017-10-24 NOTE — PROGRESS NOTES
Fm ct discussed  If you think your bag is broken or you bleed like a period or you think you are in labor or you are worried about your baby movement please call the labor and delivery unit at [phone].  rto 1 wk  Fluid normal

## 2017-10-25 NOTE — PATIENT INSTRUCTIONS
Return to clinic in 1 week.  If you think your bag of water is broken; have bleeding like a period; think you are in labor; or are worried about your baby's movement, please call the labor and delivery unit at [phone].

## 2017-10-31 ENCOUNTER — PRENATAL OFFICE VISIT (OUTPATIENT)
Dept: OBGYN | Facility: OTHER | Age: 22
End: 2017-10-31
Attending: OBSTETRICS & GYNECOLOGY
Payer: COMMERCIAL

## 2017-10-31 VITALS — BODY MASS INDEX: 25.75 KG/M2 | DIASTOLIC BLOOD PRESSURE: 68 MMHG | WEIGHT: 150 LBS | SYSTOLIC BLOOD PRESSURE: 102 MMHG

## 2017-10-31 DIAGNOSIS — O48.0 POST TERM PREGNANCY, ANTEPARTUM CONDITION OR COMPLICATION: ICD-10-CM

## 2017-10-31 DIAGNOSIS — Z34.80 SUPERVISION OF OTHER NORMAL PREGNANCY, ANTEPARTUM: Primary | ICD-10-CM

## 2017-10-31 PROCEDURE — 99207 ZZC COMPLICATED OB VISIT: CPT | Mod: 25 | Performed by: OBSTETRICS & GYNECOLOGY

## 2017-10-31 PROCEDURE — 87653 STREP B DNA AMP PROBE: CPT | Performed by: OBSTETRICS & GYNECOLOGY

## 2017-10-31 PROCEDURE — 76815 OB US LIMITED FETUS(S): CPT | Performed by: OBSTETRICS & GYNECOLOGY

## 2017-10-31 NOTE — PROGRESS NOTES
Fluid normal  GrB repeat  rto Monday  Fm count discussed  nst tomorrow  BPP and NST Friday  rto Monday

## 2017-10-31 NOTE — MR AVS SNAPSHOT
After Visit Summary   10/31/2017    Marisol Monroy    MRN: [id number]           Patient Information     Date Of Birth          1995        Visit Information        Provider Department      10/31/2017 3:00 PM Digna Mendez MD [facility]        Today's Diagnoses     Supervision of other normal pregnancy, antepartum    -  1    Post term pregnancy, antepartum condition or complication          Care Instructions    Group B strep test today.  NST tomorrow.   Return to clinic on Monday.  Fetal movement counts.  NST and BPP on Friday.  If you think your bag of water is broken; have bleeding like a period; think you are in labor; or are worried about your baby's movement, please call the labor and delivery unit at [phone].            Follow-ups after your visit        Your next 10 appointments already scheduled     Nov 06, 2017  3:10 PM CST   (Arrive by 2:55 PM)   ESTABLISHED PRENATAL with Digna Mendez MD   [facility] ([facility] )    [address] [address]   [phone]              Who to contact     If you have questions or need follow up information about today's clinic visit or your schedule please contact [facility] directly at [phone].  Normal or non-critical lab and imaging results will be communicated to you by MyChart, letter or phone within 4 business days after the clinic has received the results. If you do not hear from us within 7 days, please contact the clinic through [employer] or phone. If you have a critical or abnormal lab result, we will notify you by phone as soon as possible.  Submit refill requests through [employer] or call your pharmacy and they will forward the refill request to us. Please allow 3 business days for your refill to be completed.          Additional Information About Your Visit        [employer] Information     [employer] lets you send messages to your doctor, view your test results,  "renew your prescriptions, schedule appointments and more. To sign up, go to [email or web address] . Click on \"Log in\" on the left side of the screen, which will take you to the Welcome page. Then click on \"Sign up Now\" on the right side of the page.     You will be asked to enter the access code listed below, as well as some personal information. Please follow the directions to create your username and password.     Your access code is: [email or web address]  Expires: 2017 11:30 AM     Your access code will  in 90 days. If you need help or a new code, please call your [place] clinic or [phone].        Care EveryWhere ID     This is your Care EveryWhere ID. This could be used by other organizations to access your [place] medical records  [id number]        Your Vitals Were     BMI (Body Mass Index)                   25.75 kg/m2            Blood Pressure from Last 3 Encounters:   17 123/77   10/31/17 102/68   10/24/17 118/70    Weight from Last 3 Encounters:   17 150 lb (68 kg)   10/31/17 150 lb (68 kg)   10/24/17 150 lb (68 kg)              We Performed the Following     Group B strep PCR     US OB LIMITED, 1 OR MORE FETUSES        Primary Care Provider    Physician No Ref-Primary       NO REF-PRIMARY PHYSICIAN        Equal Access to Services     NAN RICHMOND : Hadii aad ku hadasho Somagdalena, waaxda luqadaha, qaybta kaalmada adeparamjit, sofiya angeles . So [facility] [phone].    ATENCIÓN: Si habla español, tiene a anton disposición servicios gratuitos de asistencia lingüística. Jennifer al [phone].    We comply with applicable federal civil rights laws and [place] laws. We do not discriminate on the basis of race, color, national origin, age, disability, sex, sexual orientation, or gender identity.            Thank you!     Thank you for choosing [facility]  for your care. Our goal is always to provide you with excellent care. Hearing back from our " patients is one way we can continue to improve our services. Please take a few minutes to complete the written survey that you may receive in the mail after your visit with us. Thank you!             Your Updated Medication List - Protect others around you: Learn how to safely use, store and throw away your medicines at www.disposemymeds.org.          This list is accurate as of: 10/31/17 11:59 PM.  Always use your most recent med list.                   Brand Name Dispense Instructions for use Diagnosis    acetaminophen 500 MG tablet    TYLENOL    60 tablet    Take 1-2 tablets (500-1,000 mg) by mouth every 6 hours as needed for mild pain        prenatal multivitamin plus iron 27-0.8 MG Tabs per tablet      Take 1 tablet by mouth daily

## 2017-11-01 ENCOUNTER — HOSPITAL ENCOUNTER (OUTPATIENT)
Facility: HOSPITAL | Age: 22
Discharge: HOME | End: 2017-11-01
Attending: OBSTETRICS & GYNECOLOGY | Admitting: OBSTETRICS & GYNECOLOGY
Payer: COMMERCIAL

## 2017-11-01 VITALS
SYSTOLIC BLOOD PRESSURE: 123 MMHG | DIASTOLIC BLOOD PRESSURE: 77 MMHG | RESPIRATION RATE: 16 BRPM | TEMPERATURE: 98.2 F | OXYGEN SATURATION: 98 % | BODY MASS INDEX: 25.61 KG/M2 | HEART RATE: 88 BPM | WEIGHT: 150 LBS | HEIGHT: 64 IN

## 2017-11-01 LAB
GP B STREP DNA SPEC QL NAA+PROBE: NEGATIVE
SPECIMEN SOURCE: NORMAL

## 2017-11-01 PROCEDURE — 59025 FETAL NON-STRESS TEST: CPT | Mod: 26 | Performed by: OBSTETRICS & GYNECOLOGY

## 2017-11-01 PROCEDURE — 59025 FETAL NON-STRESS TEST: CPT

## 2017-11-01 NOTE — PLAN OF CARE
Marisol Monroy    NST:  reactive  Start: 0837  Stop: 0859    Physician: Dr. Mendez  Reason For Test: Postdates  EDC:10-25-17  Gestational Age:  41 weeks    Comments:  Pressed fetal movement marker 9 times during NST      Jacklyn Martinez

## 2017-11-01 NOTE — IP AVS SNAPSHOT
MRN:[id number]                      After Visit Summary   11/1/2017    Marisol Monroy    MRN: [id number]           Thank you!     Thank you for choosing [place] for your care. Our goal is always to provide you with excellent care. Hearing back from our patients is one way we can continue to improve our services. Please take a few minutes to complete the written survey that you may receive in the mail after you visit with us. Thank you!        Patient Information     Date Of Birth          1995        About your hospital stay     You were admitted on:  November 1, 2017 You last received care in the:  [facility]    You were discharged on:  November 1, 2017       Who to Call     For medical emergencies, please call 911.  For non-urgent questions about your medical care, please call your primary care provider or clinic, None          Attending Provider     Provider Specialty    Digna Mendez MD OB/Gyn       Primary Care Provider    Physician No Ref-Primary      Your next 10 appointments already scheduled     Nov 06, 2017  3:10 PM CST   (Arrive by 2:55 PM)   ESTABLISHED PRENATAL with Digna Mendez MD   [facility] ([facility] )    [address] [place] [address]   [phone]              Further instructions from your care team       Discharge Instruction for Undelivered Patients      You were seen for: Fetal Assessment  We Consulted: Dr. Mendez  You had (Test or Medicine): Scheduled NST     Diet:   Drink 8 to 12 glasses of liquids (milk, juice, water) every day.     Activity:  Count fetal kicks everyday (see handout)  Call your doctor or nurse midwife if your baby is moving less than usual.     Call your provider if you notice:  Swelling in your face or increased swelling in your hands or legs.  Headaches that are not relieved by Tylenol (acetaminophen).  Changes in your vision (blurring: seeing spots or stars.)  Nausea (sick to your  "stomach) and vomiting (throwing up).   Weight gain of 5 pounds or more per week.  Heartburn that doesn't go away.  Signs of bladder infection: pain when you urinate (use the toilet), need to go more often and more urgently.  The bag of méndez (rupture of membranes) breaks, or you notice leaking in your underwear.  Bright red blood in your underwear.  Abdominal (lower belly) or stomach pain.  For first baby: Contractions (tightening) less than 5 minutes apart for one hour or more.  Increase or change in vaginal discharge (note the color and amount)        Follow-up:  As scheduled in the clinic   Return for scheduled NST's and BPP's          Pending Results     Date and Time Order Name Status Description    10/31/2017 1525 GROUP B STREP PCR In process             Admission Information     Date & Time Provider Department Dept. Phone    2017 Digna Mendez MD [facility] [phone]      Your Vitals Were     Blood Pressure Pulse Temperature Respirations Height Weight    123/77 88 98.2  F (36.8  C) (Oral) 16 1.626 m (5' 4\") 68 kg (150 lb)    Pulse Oximetry BMI (Body Mass Index)                98% 25.75 kg/m2          MyChart Information     [employer] lets you send messages to your doctor, view your test results, renew your prescriptions, schedule appointments and more. To sign up, go to [email or web address] . Click on \"Log in\" on the left side of the screen, which will take you to the Welcome page. Then click on \"Sign up Now\" on the right side of the page.     You will be asked to enter the access code listed below, as well as some personal information. Please follow the directions to create your username and password.     Your access code is: [email or web address]  Expires: 2017 11:30 AM     Your access code will  in 90 days. If you need help or a new code, please call your [place] clinic or [phone].        Care EveryWhere ID     This is your Care EveryWhere ID. This could be used by other " organizations to access your [place] medical records  [id number]        Equal Access to Services     NAN RICHMOND : Hadii amy Snell, candelario arnold, sofiya liriano. So [facility] [phone].    ATENCIÓN: Si habla español, tiene a anton disposición servicios gratuitos de asistencia lingüística. Llame al [phone].    We comply with applicable federal civil rights laws and [place] laws. We do not discriminate on the basis of race, color, national origin, age, disability, sex, sexual orientation, or gender identity.               Review of your medicines      UNREVIEWED medicines. Ask your doctor about these medicines        Dose / Directions    acetaminophen 500 MG tablet   Commonly known as:  TYLENOL        Dose:  500-1000 mg   Take 1-2 tablets (500-1,000 mg) by mouth every 6 hours as needed for mild pain   Quantity:  60 tablet   Refills:  0       prenatal multivitamin plus iron 27-0.8 MG Tabs per tablet        Dose:  1 tablet   Take 1 tablet by mouth daily   Refills:  0                Protect others around you: Learn how to safely use, store and throw away your medicines at www.disposemymeds.org.             Medication List: This is a list of all your medications and when to take them. Check marks below indicate your daily home schedule. Keep this list as a reference.      Medications           Morning Afternoon Evening Bedtime As Needed    acetaminophen 500 MG tablet   Commonly known as:  TYLENOL   Take 1-2 tablets (500-1,000 mg) by mouth every 6 hours as needed for mild pain                                prenatal multivitamin plus iron 27-0.8 MG Tabs per tablet   Take 1 tablet by mouth daily

## 2017-11-01 NOTE — IP AVS SNAPSHOT
[facility]    [address] [address]    Phone:  [phone]    Fax:  [phone]                                       After Visit Summary   11/1/2017    Marisol Monroy    MRN: [id number]           After Visit Summary Signature Page     I have received my discharge instructions, and my questions have been answered. I have discussed any challenges I see with this plan with the nurse or doctor.    ..........................................................................................................................................  Patient/Patient Representative Signature      ..........................................................................................................................................  Patient Representative Print Name and Relationship to Patient    ..................................................               ................................................  Date                                            Time    ..........................................................................................................................................  Reviewed by Signature/Title    ...................................................              ..............................................  Date                                                            Time

## 2017-11-01 NOTE — DISCHARGE INSTRUCTIONS
Discharge Instruction for Undelivered Patients      You were seen for: Fetal Assessment  We Consulted: Dr. Mendez  You had (Test or Medicine): Scheduled NST     Diet:   Drink 8 to 12 glasses of liquids (milk, juice, water) every day.     Activity:  Count fetal kicks everyday (see handout)  Call your doctor or nurse midwife if your baby is moving less than usual.     Call your provider if you notice:  Swelling in your face or increased swelling in your hands or legs.  Headaches that are not relieved by Tylenol (acetaminophen).  Changes in your vision (blurring: seeing spots or stars.)  Nausea (sick to your stomach) and vomiting (throwing up).   Weight gain of 5 pounds or more per week.  Heartburn that doesn't go away.  Signs of bladder infection: pain when you urinate (use the toilet), need to go more often and more urgently.  The bag of méndez (rupture of membranes) breaks, or you notice leaking in your underwear.  Bright red blood in your underwear.  Abdominal (lower belly) or stomach pain.  For first baby: Contractions (tightening) less than 5 minutes apart for one hour or more.  Increase or change in vaginal discharge (note the color and amount)        Follow-up:  As scheduled in the clinic   Return for scheduled NST's and BPP's

## 2017-11-02 NOTE — PATIENT INSTRUCTIONS
Group B strep test today.  NST tomorrow.   Return to clinic on Monday.  Fetal movement counts.  NST and BPP on Friday.  If you think your bag of water is broken; have bleeding like a period; think you are in labor; or are worried about your baby's movement, please call the labor and delivery unit at [phone].

## 2017-11-03 ENCOUNTER — HOSPITAL ENCOUNTER (OUTPATIENT)
Facility: HOSPITAL | Age: 22
Discharge: HOME | End: 2017-11-03
Attending: OBSTETRICS & GYNECOLOGY | Admitting: OBSTETRICS & GYNECOLOGY
Payer: COMMERCIAL

## 2017-11-03 ENCOUNTER — HOSPITAL ENCOUNTER (OUTPATIENT)
Dept: ULTRASOUND IMAGING | Facility: HOSPITAL | Age: 22
Discharge: HOME | End: 2017-11-03
Attending: OBSTETRICS & GYNECOLOGY | Admitting: OBSTETRICS & GYNECOLOGY
Payer: COMMERCIAL

## 2017-11-03 VITALS
RESPIRATION RATE: 12 BRPM | HEART RATE: 65 BPM | SYSTOLIC BLOOD PRESSURE: 129 MMHG | DIASTOLIC BLOOD PRESSURE: 77 MMHG | TEMPERATURE: 98 F

## 2017-11-03 DIAGNOSIS — O48.0 POST TERM PREGNANCY, ANTEPARTUM CONDITION OR COMPLICATION: ICD-10-CM

## 2017-11-03 PROCEDURE — 76819 FETAL BIOPHYS PROFIL W/O NST: CPT | Mod: TC

## 2017-11-03 NOTE — PROGRESS NOTES
Fetal Non-Stress Test Results    NST Ordered By: Rashi Rain  NST Medical Indication: spottimg    NST Start & Stop Times  NST Start Time: 1020  NST Stop Time: 1040                            NST Results  Fetus A   Baseline Rate: 120  Accelerations: Present  Decelerations: None  Interpretation: reactive

## 2017-11-04 NOTE — PLAN OF CARE
Marisol Monroy        NST:  reactive  Start: 1623  Stop: 1645      Physician: DR YAMILE Mendez  Reason For Test: Postdates  EDC:10/25/2017  Gestational Age: 41 2/7    Comments:        Loree Caruso

## 2017-11-05 ENCOUNTER — HOSPITAL ENCOUNTER (OUTPATIENT)
Facility: HOSPITAL | Age: 22
Discharge: HOME | End: 2017-11-05
Attending: OBSTETRICS & GYNECOLOGY | Admitting: SPECIALIST
Payer: COMMERCIAL

## 2017-11-05 VITALS
OXYGEN SATURATION: 98 % | DIASTOLIC BLOOD PRESSURE: 82 MMHG | HEART RATE: 75 BPM | RESPIRATION RATE: 16 BRPM | TEMPERATURE: 98.3 F | SYSTOLIC BLOOD PRESSURE: 120 MMHG

## 2017-11-05 PROBLEM — Z36.89 ENCOUNTER FOR TRIAGE IN PREGNANT PATIENT: Status: ACTIVE | Noted: 2017-11-05

## 2017-11-05 PROCEDURE — 59025 FETAL NON-STRESS TEST: CPT | Mod: 76

## 2017-11-05 NOTE — DISCHARGE INSTRUCTIONS
Patient's After Visit Summary was reviewed with patient.  Patient verbalized understanding of After Visit Summary, recommended follow up and was given an opportunity to ask questions.     Discharged with   OB Triage Note  Marisol Monroy  MRN: [id number]  Gestational Age: 41w4d      Marisol Monroy presents for cramping and postdates    States cramping on and off this am.  Denies LOF, or  vaginal bleeding.      Dr. Rain notified of arrival and condition.  Oriented patient to surroundings. Call light within reach.     FHT: 135  NST Start Time:  NST Stop Time:  NST: Reactive .  Uterine Assessment:Contractions irregular:   SVE 1cm, 80%effaced, posterior.  Plan:  -Initial NST, then fetal/uterine monitoring per MD/patient plan.  -Sterile vaginal exam/sterile speculum exam.  -Nursing education on signs of labor provided.

## 2017-11-05 NOTE — IP AVS SNAPSHOT
[facility]    [address] [address]    Phone:  [phone]    Fax:  [phone]                                       After Visit Summary   11/5/2017    Marisol Monroy    MRN: [id number]           After Visit Summary Signature Page     I have received my discharge instructions, and my questions have been answered. I have discussed any challenges I see with this plan with the nurse or doctor.    ..........................................................................................................................................  Patient/Patient Representative Signature      ..........................................................................................................................................  Patient Representative Print Name and Relationship to Patient    ..................................................               ................................................  Date                                            Time    ..........................................................................................................................................  Reviewed by Signature/Title    ...................................................              ..............................................  Date                                                            Time

## 2017-11-05 NOTE — IP AVS SNAPSHOT
"                  MRN:[id number]                      After Visit Summary   2017    Marisol Monroy    MRN: [id number]           Thank you!     Thank you for choosing [place] for your care. Our goal is always to provide you with excellent care. Hearing back from our patients is one way we can continue to improve our services. Please take a few minutes to complete the written survey that you may receive in the mail after you visit with us. Thank you!        Patient Information     Date Of Birth          1995        About your hospital stay     You were admitted on:  2017 You last received care in the:  [facility]    You were discharged on:  2017       Who to Call     For medical emergencies, please call 911.  For non-urgent questions about your medical care, please call your primary care provider or clinic, None          Attending Provider     Provider Specialty    Digna Mendez MD OB/Gyn       Primary Care Provider    Physician No Ref-Primary      Your next 10 appointments already scheduled     2017  3:10 PM CST   (Arrive by 2:55 PM)   ESTABLISHED PRENATAL with Digna Mendez MD   [facility] ([facility]    [address] [place], [place] [address] [place] [address]   [phone]              Further instructions from your care team       Patient's After Visit Summary was reviewed with patient and/or { :[id number]}.   Patient verbalized understanding of After Visit Summary, recommended follow up and was given an opportunity to ask questions.   Discharge medications sent home with patient/family: {YES CAPS/NO SMALL:492578::\"***\"}   Discharged with {Family Member:917004}    OB Triage Note  Marisol Monroy  MRN: [id number]  Gestational Age: 41w4d      Marisol Monroy presents for cramping and postdates    States jewel since ***.  Rates pain at ***/10.  Bleeding: {BLEEDING OB:648974}  began at {TIMES OF DAY:394088}.  " "{DENIES:678468::\"Denies\"} LOF.  Reports {AMOUNT (NL):130001} {AMNIOTIC membranes:508938} amount.    *** notified of arrival and condition.  Oriented patient to surroundings. Call light within reach.     FHT: ***  NST Start Time:  NST Stop Time:  NST: {FETAL ASSESSMENT REACTIVE, NON-REACTIVE, AGA:224626} ***.  Uterine Assessment:Contractions: {UTERINE POSITION:572174} of {CONTRACTION STRENGTH:016268} quality.    Plan:  -Initial NST, then fetal/uterine monitoring per MD/patient plan.  -Sterile vaginal exam/sterile speculum exam.  -Nursing education on *** provided.      Pending Results     No orders found from 11/3/2017 to 2017.            Admission Information     Date & Time Provider Department Dept. Phone    2017 Digna Mendez MD [facility] [phone]      Your Vitals Were     Blood Pressure Pulse Temperature Respirations Pulse Oximetry       120/82 75 98.3  F (36.8  C) (Oral) 16 98%       MyChart Information     [employer] lets you send messages to your doctor, view your test results, renew your prescriptions, schedule appointments and more. To sign up, go to [email or web address] . Click on \"Log in\" on the left side of the screen, which will take you to the Welcome page. Then click on \"Sign up Now\" on the right side of the page.     You will be asked to enter the access code listed below, as well as some personal information. Please follow the directions to create your username and password.     Your access code is: [email or web address]  Expires: 2017 10:30 AM     Your access code will  in 90 days. If you need help or a new code, please call your [place] clinic or [phone].        Care EveryWhere ID     This is your Care EveryWhere ID. This could be used by other organizations to access your [place] medical records  [id number]        Equal Access to Services     NAN RICHMOND AH: Alexandru Snell, candelario arnold, qaybta kaalmasofiya cox" judith navasaan ah. So [facility] [phone].    ATENCIÓN: Si gingerla jessy, tiene a anton disposición servicios gratuitos de asistencia lingüística. Jennifer al [phone].    We comply with applicable federal civil rights laws and [place] laws. We do not discriminate on the basis of race, color, national origin, age, disability, sex, sexual orientation, or gender identity.               Review of your medicines      UNREVIEWED medicines. Ask your doctor about these medicines        Dose / Directions    acetaminophen 500 MG tablet   Commonly known as:  TYLENOL        Dose:  500-1000 mg   Take 1-2 tablets (500-1,000 mg) by mouth every 6 hours as needed for mild pain   Quantity:  60 tablet   Refills:  0       prenatal multivitamin plus iron 27-0.8 MG Tabs per tablet        Dose:  1 tablet   Take 1 tablet by mouth daily   Refills:  0                Protect others around you: Learn how to safely use, store and throw away your medicines at www.disposemymeds.org.             Medication List: This is a list of all your medications and when to take them. Check marks below indicate your daily home schedule. Keep this list as a reference.      Medications           Morning Afternoon Evening Bedtime As Needed    acetaminophen 500 MG tablet   Commonly known as:  TYLENOL   Take 1-2 tablets (500-1,000 mg) by mouth every 6 hours as needed for mild pain                                prenatal multivitamin plus iron 27-0.8 MG Tabs per tablet   Take 1 tablet by mouth daily

## 2017-11-06 ENCOUNTER — HOSPITAL ENCOUNTER (INPATIENT)
Facility: HOSPITAL | Age: 22
LOS: 1 days | Discharge: HOME | End: 2017-11-08
Attending: OBSTETRICS & GYNECOLOGY | Admitting: OBSTETRICS & GYNECOLOGY
Payer: COMMERCIAL

## 2017-11-06 ENCOUNTER — PRENATAL OFFICE VISIT (OUTPATIENT)
Dept: OBGYN | Facility: OTHER | Age: 22
End: 2017-11-06
Attending: OBSTETRICS & GYNECOLOGY
Payer: COMMERCIAL

## 2017-11-06 ENCOUNTER — TELEPHONE (OUTPATIENT)
Dept: OBGYN | Facility: HOSPITAL | Age: 22
End: 2017-11-06

## 2017-11-06 VITALS
DIASTOLIC BLOOD PRESSURE: 76 MMHG | SYSTOLIC BLOOD PRESSURE: 128 MMHG | HEIGHT: 64 IN | BODY MASS INDEX: 25.61 KG/M2 | WEIGHT: 150 LBS

## 2017-11-06 DIAGNOSIS — O48.0 POST TERM PREGNANCY, ANTEPARTUM CONDITION OR COMPLICATION: ICD-10-CM

## 2017-11-06 DIAGNOSIS — Z34.80 SUPERVISION OF OTHER NORMAL PREGNANCY, ANTEPARTUM: Primary | ICD-10-CM

## 2017-11-06 PROCEDURE — 99207 ZZC COMPLICATED OB VISIT: CPT | Mod: 25 | Performed by: OBSTETRICS & GYNECOLOGY

## 2017-11-06 PROCEDURE — 76815 OB US LIMITED FETUS(S): CPT | Performed by: OBSTETRICS & GYNECOLOGY

## 2017-11-06 ASSESSMENT — PAIN SCALES - GENERAL: PAINLEVEL: NO PAIN (0)

## 2017-11-06 NOTE — PROGRESS NOTES
11/1/17  Fetal Non-Stress Test Results    NST Ordered By: Digna Mendez MD    NST Medical Indication: Post Dates    NST Start & Stop Times  NST Start Time: 0837  NST Stop Time: 0859                            NST Results  Fetus A   Baseline Rate: normal  Accelerations:Accelerations  present  Decelerations:decelerations absent  Interpretation: reactive

## 2017-11-06 NOTE — MR AVS SNAPSHOT
"              After Visit Summary   11/6/2017    Marisol Monroy    MRN: [id number]           Patient Information     Date Of Birth          1995        Visit Information        Provider Department      11/6/2017 3:10 PM Digna Mendez MD [facility]        Today's Diagnoses     Supervision of other normal pregnancy, antepartum    -  1    Post term pregnancy, antepartum condition or complication           Follow-ups after your visit        Your next 10 appointments already scheduled     Nov 20, 2017  2:40 PM CST   (Arrive by 2:25 PM)   Post Partum with Digna Mendez MD   [facility]    [address] [place] [address]   [phone]              Who to contact     If you have questions or need follow up information about today's clinic visit or your schedule please contact [facility] directly at [phone].  Normal or non-critical lab and imaging results will be communicated to you by MyChart, letter or phone within 4 business days after the clinic has received the results. If you do not hear from us within 7 days, please contact the clinic through MyChart or phone. If you have a critical or abnormal lab result, we will notify you by phone as soon as possible.  Submit refill requests through [employer] or call your pharmacy and they will forward the refill request to us. Please allow 3 business days for your refill to be completed.          Additional Information About Your Visit        MyChart Information     [employer] lets you send messages to your doctor, view your test results, renew your prescriptions, schedule appointments and more. To sign up, go to [email or web address] . Click on \"Log in\" on the left side of the screen, which will take you to the Welcome page. Then click on \"Sign up Now\" on the right side of the page.     You will be asked to enter the access code listed below, as well as some personal information. " "Please follow the directions to create your username and password.     Your access code is: [email or web address]  Expires: 2018  4:24 PM     Your access code will  in 90 days. If you need help or a new code, please call your [facility] clinic or [phone].        Care EveryWhere ID     This is your Care EveryWhere ID. This could be used by other organizations to access your [facility] medical records  [id number]        Your Vitals Were     Height BMI (Body Mass Index)                5' 4\" (1.626 m) 25.75 kg/m2           Blood Pressure from Last 3 Encounters:   17 128/76   17 120/82   17 129/77    Weight from Last 3 Encounters:   17 150 lb (68 kg)   17 150 lb (68 kg)   10/31/17 150 lb (68 kg)              We Performed the Following     US OB LIMITED, 1 OR MORE FETUSES        Primary Care Provider    Physician No Ref-Primary       NO REF-PRIMARY PHYSICIAN        Equal Access to Services     [facility] : Hadii amy ku hadasho Soomaali, waaxda luqadaha, qaybta kaalmada adeegyada, sofiya angeles . So [facility] [phone].    ATENCIÓN: Si habla español, tiene a anton disposición servicios gratuitos de asistencia lingüística. Llame al [phone].    We comply with applicable federal civil rights laws and [place] laws. We do not discriminate on the basis of race, color, national origin, age, disability, sex, sexual orientation, or gender identity.            Thank you!     Thank you for choosing [facility]  for your care. Our goal is always to provide you with excellent care. Hearing back from our patients is one way we can continue to improve our services. Please take a few minutes to complete the written survey that you may receive in the mail after your visit with us. Thank you!             Your Updated Medication List - Protect others around you: Learn how to safely use, store and throw away your medicines at www.disposemymeds.org.          This list " is accurate as of: 11/6/17  4:29 PM.  Always use your most recent med list.                   Brand Name Dispense Instructions for use Diagnosis    acetaminophen 500 MG tablet    TYLENOL    60 tablet    Take 1-2 tablets (500-1,000 mg) by mouth every 6 hours as needed for mild pain        prenatal multivitamin plus iron 27-0.8 MG Tabs per tablet      Take 1 tablet by mouth daily

## 2017-11-06 NOTE — IP AVS SNAPSHOT
[facility]    [address] [address]    Phone:  [phone]    Fax:  [phone]                                       After Visit Summary   11/6/2017    Marisol Monroy    MRN: [id number]           After Visit Summary Signature Page     I have received my discharge instructions, and my questions have been answered. I have discussed any challenges I see with this plan with the nurse or doctor.    ..........................................................................................................................................  Patient/Patient Representative Signature      ..........................................................................................................................................  Patient Representative Print Name and Relationship to Patient    ..................................................               ................................................  Date                                            Time    ..........................................................................................................................................  Reviewed by Signature/Title    ...................................................              ..............................................  Date                                                            Time

## 2017-11-06 NOTE — PROGRESS NOTES
Fluid normal  RBAQ's  Alternatives discussed  C/s and stillbirth discussed  Cervidil Tues night  Fm ct discussed  rto 2 wks

## 2017-11-06 NOTE — NURSING NOTE
"Chief Complaint   Patient presents with     Prenatal Care       Initial /76  Ht 5' 4\" (1.626 m)  Wt 150 lb (68 kg)  BMI 25.75 kg/m2 Estimated body mass index is 25.75 kg/(m^2) as calculated from the following:    Height as of this encounter: 5' 4\" (1.626 m).    Weight as of this encounter: 150 lb (68 kg).  Medication Reconciliation: complete     Jacklyn Lai    "

## 2017-11-06 NOTE — IP AVS SNAPSHOT
MRN:[id number]                      After Visit Summary   11/6/2017    Marisol Monroy    MRN: [id number]           Thank you!     Thank you for choosing [facility] for your care. Our goal is always to provide you with excellent care. Hearing back from our patients is one way we can continue to improve our services. Please take a few minutes to complete the written survey that you may receive in the mail after you visit with us. Thank you!        Patient Information     Date Of Birth          1995        Designated Caregiver       Most Recent Value    Caregiver    Will someone help with your care after discharge? no      About your hospital stay     You were admitted on:  November 6, 2017 You last received care in the:  [facility]    You were discharged on:  November 8, 2017       Who to Call     For medical emergencies, please call 911.  For non-urgent questions about your medical care, please call your primary care provider or clinic, None          Attending Provider     Provider Specialty    Digna Mendez MD OB/Gyn       Primary Care Provider    Physician No Ref-Primary      Your next 10 appointments already scheduled     Nov 20, 2017  2:40 PM CST   (Arrive by 2:25 PM)   Post Partum with Digna Mendez MD   [facility] ([facility] [place] )    [address]  [place] [address]   [phone]              Further instructions from your care team       Abstain for now,hot water,kegels,keep appt,enjoy baby!    Postpartum Vaginal Delivery Instructions    Activity    Ask family and friends for help when you need it.  Do not place anything in your vagina until approved by your Physician .  You are not restricted on other activities, but take it easy for a few weeks to allow your body to recover from delivery.  You are able to do any activities you feel up to that point.  No driving until you have stopped taking narcotic pain medications.    Call your  health care provider if you have any of these symptoms:    Increased pain, swelling, redness, or fluid around your stiches from an episiotomy or perineal tear.  A fever above 100.4 F (38 C) with or without chills when placing a thermometer under your tongue.  You soak a sanitary pad with blood within 1 hour, or you see blood clots larger than a golf ball.  Bleeding that lasts more than 6 weeks.  Vaginal discharge that smells bad.  Severe pain, cramping or tenderness in your lower belly area.  A need to urinate more frequently (use the toilet more often), more urgently (use the toilet very quickly), or it burns when you urinate.  Nausea and vomiting.  Redness, swelling or pain around a vein in your leg.  Problems breastfeeding or a red or painful area on your breast.  Chest pain and cough or are gasping for air.  Problems coping with sadness, anxiety, or depression.  If you have any concerns about hurting yourself or the baby, call your provider immediately. The Safe Place for Newborns law allows you to bring your baby to the hospital up to 7 days, no questions asked.  You have questions or concerns after you return home.    Keep your hands clean:  Always wash your hands before touching your perineal area and stitches.  This helps reduce your risk of infection.  If your hands aren't dirty, you may use an alcohol hand-rub to clean your hands. Keep your nails clean and short.      Pending Results     No orders found from 11/4/2017 to 11/7/2017.            Statement of Approval     Ordered          11/08/17 0017  I have reviewed and agree with all the recommendations and orders detailed in this document.  EFFECTIVE NOW     Approved and electronically signed by:  Digna Mendez MD             Admission Information     Date & Time Provider Department Dept. Phone    11/6/2017 Digna Mendez MD [facility] [phone]      Your Vitals Were     Blood Pressure Pulse Temperature Respirations Pulse Oximetry     "   126/67 70 98.4  F (36.9  C) (Oral) 16 96%       MyChart Information     [employer] lets you send messages to your doctor, view your test results, renew your prescriptions, schedule appointments and more. To sign up, go to [email or web address] . Click on \"Log in\" on the left side of the screen, which will take you to the Welcome page. Then click on \"Sign up Now\" on the right side of the page.     You will be asked to enter the access code listed below, as well as some personal information. Please follow the directions to create your username and password.     Your access code is: [email or web address]  Expires: 2018  4:24 PM     Your access code will  in 90 days. If you need help or a new code, please call your [facility] clinic or [phone].        Care EveryWhere ID     This is your Care EveryWhere ID. This could be used by other organizations to access your [facility] medical records  [id number]        Equal Access to Services     [facility] : Hadii amy browne Somagdalena, waaxda luqadaha, qaybta kaalmaoleg kraus, sofiya angeles . So [facility] [phone].    ATENCIÓN: Si habla español, tiene a anton disposición servicios gratuitos de asistencia lingüística. Llame al [phone].    We comply with applicable federal civil rights laws and [place] laws. We do not discriminate on the basis of race, color, national origin, age, disability, sex, sexual orientation, or gender identity.               Review of your medicines      UNREVIEWED medicines. Ask your doctor about these medicines        Dose / Directions    acetaminophen 500 MG tablet   Commonly known as:  TYLENOL        Dose:  500-1000 mg   Take 1-2 tablets (500-1,000 mg) by mouth every 6 hours as needed for mild pain   Quantity:  60 tablet   Refills:  0       prenatal multivitamin plus iron 27-0.8 MG Tabs per tablet        Dose:  1 tablet   Take 1 tablet by mouth daily   Refills:  0                Protect others around you: " Learn how to safely use, store and throw away your medicines at www.disposemymeds.org.             Medication List: This is a list of all your medications and when to take them. Check marks below indicate your daily home schedule. Keep this list as a reference.      Medications           Morning Afternoon Evening Bedtime As Needed    acetaminophen 500 MG tablet   Commonly known as:  TYLENOL   Take 1-2 tablets (500-1,000 mg) by mouth every 6 hours as needed for mild pain                                prenatal multivitamin plus iron 27-0.8 MG Tabs per tablet   Take 1 tablet by mouth daily                                          More Information        Breast Care After Birth  A few days after your baby s birth, your breasts will swell with milk. They are likely to feel tender and heavy. This is normal. To help prevent breast soreness and control irritation, follow these tips:     Moist heat, like a shower, helps to promote the release of breastmilk.   Coping with swelling  Here are tips to cope with swelling:    Use cold compresses or an ice pack to help reduce the ache or pain.    Breastfeed often to keep milk from clogging your breast ducts.    If your nipples are flat from breast swelling, hand express some milk. Squeeze out a few drops of milk by massaging and compressing your breasts.    If you have swelling with pain or fever, call your healthcare provider.  Preventing sore nipples  Here are tips to prevent sore nipples:    Make sure baby latches on to your breast correctly. The baby s mouth should be opened very wide and your entire areola should be in the baby's mouth.    You can let milk dry on your nipples. This dried milk can protect the skin on your nipple.    Do not use alcohol, soap, or scented cleansers on your breasts. These can cause the nipples to dry and crack.    Do not wear nursing pads that are lined with plastic. They hold in moisture and can cause chapping.    If you have cracked or bleeding  nipples, consult your healthcare provider or a lactation consultant. He or she will make sure that your baby's latch is correct and may suggest topical treatment, like pure lanolin.  Choosing a good bra  Wearing the right-sized bra is especially important now. If a bra is too tight, it may cause a duct in your breast to clog and become irritated. If possible, have a salesperson help fit you for a new bra. Look for one that s 100% cotton and comfortable. Also, choose a bra with wide straps that won t dig into your back and shoulders. If you re breastfeeding, find a nursing bra that allows you to uncover one breast at a time.  If you are not breastfeeding  Here are tips to avoid discomfort:    Avoid stimulation of nipples    Wear a tight-fitting bra    Apply cold compresses or ice packs for discomfort     Call your healthcare provider   Call your healthcare provider right away if you have any of the following:    A fever or chills    Extreme tiredness and body aches, as if you have the flu    Burning or pain in one or both breasts    Red streaks on a breast    Hard or lumpy spots in one or both breasts    A feeling of warmth or heat in one or both breasts    Breasts so swollen your baby cannot latch on to the nipples    Nipples that are cracked or bleeding    Low milk supply or your milk does not flow freely   Date Last Reviewed: 9/7/2015 2000-2017 [employer]. [address] [place], [place] [address]. All rights reserved. This information is not intended as a substitute for professional medical care. Always follow your healthcare professional's instructions.                Nutrition While Breastfeeding  Do I need a special diet for breastfeeding?  You don't have to eat a special diet to produce enough milk for your baby. Also, your milk will be of good quality for your baby regardless of what you eat. However, your body needs fuel to make breastmilk, so eat your fill of a variety of foods.  Breastfeeding isn t an excuse to eat and drink everything you want, but it s not a reason to avoid favorite foods either.   Healthy diet for the new mother  A healthy diet is recommended for all women and offers many benefits to the new mother. Choosing a variety of healthy foods creates a pattern for the entire family, and each family member benefits. Women who are breastfeeding need about 500 extra calories per day. Some women might need more, while others might need less. When choosing foods, use the nutrition chart below as a guide.    Bread, cereal, rice, and pasta Vegetables Fruit   Milk, yogurt, and cheese Meat, poultry, fish,  dry beans, eggs, and nuts Fats, oils, and sweets  (use sparingly)   What s good for you?  Here are some things to do:    Breastfeeding women need to drink when they feel thirsty. There is no specific amount of water you need to drink to make enough milk.    Follow healthy eating guidelines.    Snack on fruit or low-fat dairy products if you re hungry between meals.    If your healthcare provider recommends it, keep taking prenatal vitamins.  What s not good for you?  Here are other things to consider:    Limit fatty foods and foods that are high in sugar (cookies, cakes).    Be aware that what enters your body may pass into your breastmilk. Limit caffeine. It is not just in coffee, but is also in cola, tea, and chocolate.    Talk with your healthcare provider before taking any medicines. It is important to let your healthcare provider know that you are nursing. Some medicines are not safe with breastfeeding.    Remember: alcohol, cigarettes, and drugs also affect your breastmilk and your baby. Talk with your healthcare provider.  Date Last Reviewed: 9/7/2015 2000-2017 [employer]. [address] [place] [address], [place] [address]. All rights reserved. This information is not intended as a substitute for professional medical care. Always follow your healthcare professional's  instructions.                Breastfeeding: Caring for Yourself  When you have a new little person in your life, it s easy to forget about yourself. There are new demands on your time. There are also new responsibilities. But it s important to take care of yourself. This will help you take better care of your new baby.     Healthy habits  Here are some healthy tips:    Get exercise when you can. If you leak milk, it will help to nurse right before the activity.    Avoid smoking. Smoking is unhealthy for you and may cause you to make less milk. Secondhand smoke is also harmful to your baby.    Talk to your healthcare provider about alcohol, if you choose to drink.    When you re sick, tell your healthcare provider that you are breastfeeding. Few medicines and illnesses affect breastfeeding, but it is important to check.    Ask your healthcare provider before taking any prescription or over-the-counter medicines, herbs, or supplements.  Comfy clothes  Suggestions for being comfortable when breastfeeding include:    Find a comfortable nursing bra. Many women find underwire uncomfortable. Some stores offer on-site fittings. Ask your healthcare provider or nurse for a referral.    If you have leaking milk, place breast pads inside your bra.    Choose an extra-supportive bra for exercise. Or you can wear two bras at the same time for more support.    Wear loose tops that can be lifted for breastfeeding. You can also buy clothes specially made for breastfeeding moms.  A note about sex  After delivery, it may take a while before your interest in sex returns. Share your feelings with your partner. Your healthcare provider will let you know when it is safe to resume having sex. When you re ready, know that:    There are several forms of birth control that can be used while breastfeeding. Ask your healthcare provider what to use for pregnancy prevention while you are nursing.    Breastfeeding hormones may cause vaginal  dryness. Some women find using a water-based lubricant makes sex more comfortable.    Milk may let down when you are aroused. Applying pressure on the nipple, using breast pads, or a towel may help with this.  When to call your healthcare provider  Call your healthcare provider if:    You feel overwhelmed and don't know where to turn.    You feel very sad or don t want to be with your baby.    You feel like your baby cries all the time and won't be soothed    You are unable to exercise, or have sex, without discomfort.    You are unsure about a medicine, illness, or activity and its effect on breastfeeding.   Date Last Reviewed: 9/7/2015 2000-2017 [employer]. [address] [place] [address], [place] [address]. All rights reserved. This information is not intended as a substitute for professional medical care. Always follow your healthcare professional's instructions.

## 2017-11-07 PROBLEM — Z37.9 NORMAL LABOR: Status: ACTIVE | Noted: 2017-11-07

## 2017-11-07 PROBLEM — Z34.80 SUPERVISION OF OTHER NORMAL PREGNANCY, ANTEPARTUM: Status: RESOLVED | Noted: 2017-03-06 | Resolved: 2017-11-07

## 2017-11-07 PROBLEM — O09.33 INSUFFICIENT PRENATAL CARE IN THIRD TRIMESTER: Status: RESOLVED | Noted: 2017-08-08 | Resolved: 2017-11-07

## 2017-11-07 PROBLEM — Z36.89 ENCOUNTER FOR TRIAGE IN PREGNANT PATIENT: Status: RESOLVED | Noted: 2017-11-05 | Resolved: 2017-11-07

## 2017-11-07 PROBLEM — Z23 NEED FOR TDAP VACCINATION: Status: RESOLVED | Noted: 2017-08-08 | Resolved: 2017-11-07

## 2017-11-07 PROBLEM — O48.0 POST TERM PREGNANCY, ANTEPARTUM CONDITION OR COMPLICATION: Status: RESOLVED | Noted: 2017-10-31 | Resolved: 2017-11-07

## 2017-11-07 LAB
A1 MICROGLOB PLACENTAL VAG QL: POSITIVE
ABO + RH BLD: NORMAL
ABO + RH BLD: NORMAL
BLD GP AB SCN SERPL QL: NORMAL
BLOOD BANK CMNT PATIENT-IMP: NORMAL
ERYTHROCYTE [DISTWIDTH] IN BLOOD BY AUTOMATED COUNT: 12.3 % (ref 10–15)
HCT VFR BLD AUTO: 32 % (ref 35–47)
HGB BLD-MCNC: 11.5 G/DL (ref 11.7–15.7)
MCH RBC QN AUTO: 31.2 PG (ref 26.5–33)
MCHC RBC AUTO-ENTMCNC: 35.9 G/DL (ref 31.5–36.5)
MCV RBC AUTO: 87 FL (ref 78–100)
PLATELET # BLD AUTO: 235 10E9/L (ref 150–450)
RBC # BLD AUTO: 3.69 10E12/L (ref 3.8–5.2)
SPECIMEN EXP DATE BLD: NORMAL
WBC # BLD AUTO: 16.5 10E9/L (ref 4–11)

## 2017-11-07 PROCEDURE — 25000132 ZZH RX MED GY IP 250 OP 250 PS 637

## 2017-11-07 PROCEDURE — 86850 RBC ANTIBODY SCREEN: CPT | Performed by: OBSTETRICS & GYNECOLOGY

## 2017-11-07 PROCEDURE — S0191 MISOPROSTOL, ORAL, 200 MCG: HCPCS

## 2017-11-07 PROCEDURE — 86900 BLOOD TYPING SEROLOGIC ABO: CPT | Performed by: OBSTETRICS & GYNECOLOGY

## 2017-11-07 PROCEDURE — 86901 BLOOD TYPING SEROLOGIC RH(D): CPT | Performed by: OBSTETRICS & GYNECOLOGY

## 2017-11-07 PROCEDURE — 84112 EVAL AMNIOTIC FLUID PROTEIN: CPT | Performed by: OBSTETRICS & GYNECOLOGY

## 2017-11-07 PROCEDURE — 85027 COMPLETE CBC AUTOMATED: CPT | Performed by: OBSTETRICS & GYNECOLOGY

## 2017-11-07 PROCEDURE — 25000125 ZZHC RX 250

## 2017-11-07 PROCEDURE — 72200001 ZZH LABOR CARE VAGINAL DELIVERY SINGLE: Performed by: OBSTETRICS & GYNECOLOGY

## 2017-11-07 PROCEDURE — 12000027 ZZH R&B OB

## 2017-11-07 PROCEDURE — 25000132 ZZH RX MED GY IP 250 OP 250 PS 637: Performed by: OBSTETRICS & GYNECOLOGY

## 2017-11-07 PROCEDURE — 59400 OBSTETRICAL CARE: CPT | Performed by: OBSTETRICS & GYNECOLOGY

## 2017-11-07 PROCEDURE — 36415 COLL VENOUS BLD VENIPUNCTURE: CPT | Performed by: OBSTETRICS & GYNECOLOGY

## 2017-11-07 RX ORDER — OXYTOCIN/0.9 % SODIUM CHLORIDE 30/500 ML
PLASTIC BAG, INJECTION (ML) INTRAVENOUS
Status: DISPENSED
Start: 2017-11-07 | End: 2017-11-07

## 2017-11-07 RX ORDER — IBUPROFEN 800 MG/1
800 TABLET, FILM COATED ORAL EVERY 8 HOURS
Status: DISCONTINUED | OUTPATIENT
Start: 2017-11-07 | End: 2017-11-08 | Stop reason: HOSPADM

## 2017-11-07 RX ORDER — LIDOCAINE 50 MG/G
OINTMENT TOPICAL
Status: DISCONTINUED | OUTPATIENT
Start: 2017-11-07 | End: 2017-11-08 | Stop reason: HOSPADM

## 2017-11-07 RX ORDER — ACETAMINOPHEN 325 MG/1
325 TABLET ORAL EVERY 4 HOURS PRN
Status: DISCONTINUED | OUTPATIENT
Start: 2017-11-07 | End: 2017-11-08 | Stop reason: HOSPADM

## 2017-11-07 RX ORDER — MISOPROSTOL 200 UG/1
TABLET ORAL
Status: COMPLETED
Start: 2017-11-07 | End: 2017-11-07

## 2017-11-07 RX ORDER — MISOPROSTOL 200 UG/1
200 TABLET ORAL
Status: COMPLETED | OUTPATIENT
Start: 2017-11-07 | End: 2017-11-07

## 2017-11-07 RX ORDER — LIDOCAINE 50 MG/G
OINTMENT TOPICAL
Status: COMPLETED
Start: 2017-11-07 | End: 2017-11-07

## 2017-11-07 RX ORDER — LANOLIN 100 %
OINTMENT (GRAM) TOPICAL
Status: DISCONTINUED | OUTPATIENT
Start: 2017-11-07 | End: 2017-11-08 | Stop reason: HOSPADM

## 2017-11-07 RX ORDER — MISOPROSTOL 200 UG/1
TABLET ORAL
Status: DISPENSED
Start: 2017-11-07 | End: 2017-11-07

## 2017-11-07 RX ORDER — IBUPROFEN 800 MG/1
TABLET, FILM COATED ORAL
Status: COMPLETED
Start: 2017-11-07 | End: 2017-11-07

## 2017-11-07 RX ADMIN — IBUPROFEN 800 MG: 800 TABLET ORAL at 02:56

## 2017-11-07 RX ADMIN — MISOPROSTOL 200 MCG: 200 TABLET ORAL at 02:35

## 2017-11-07 RX ADMIN — Medication: at 02:40

## 2017-11-07 RX ADMIN — LIDOCAINE HYDROCHLORIDE: 10 INJECTION, SOLUTION INFILTRATION; PERINEURAL at 02:45

## 2017-11-07 RX ADMIN — LIDOCAINE: 50 OINTMENT TOPICAL at 02:40

## 2017-11-07 RX ADMIN — IBUPROFEN 800 MG: 800 TABLET ORAL at 11:55

## 2017-11-07 RX ADMIN — IBUPROFEN 800 MG: 800 TABLET ORAL at 19:30

## 2017-11-07 NOTE — PLAN OF CARE
Face to face report given with opportunity to observe patient.    Report given to Mica Dinero   11/7/2017  7:22 AM

## 2017-11-07 NOTE — PLAN OF CARE
Problem: Patient Care Overview  Goal: Plan of Care/Patient Progress Review  Outcome: Improving  Assessments completed as charted. B/P: 125/66, T: 98.5, P: 70, R: 16. Rates pain: 0/10. Voiding without difficulty. Fundus: Midline, U/U. Lochia: Light. Activity: normal activity. Infant feeding: Breast feeding going well.      LATCH Score:   Latch: 2 - Good Latch  Audible Swallowin - Spontaneous & frequent  Type of Nipple: (Breast/Nipple) 2 - Everted  Comfort: 2 - Soft, Nontender  Hold: 2 - No Assist   Total LATCH Score: 10     Postpartum breastfeeding assessment completed and education provided, see Patient Education Activity.  Items included in the education are:     proper positioning and latch    effectiveness of feeding    manual expression    handling and storing breastmilk    maintenance of breastfeeding for the first 6 months    sign/symptoms of infant feeding issues requiring referral to qualified health care provider  Postpartum care education provided, see Patient Education activity. Patient denies needs. Will monitor.  Meagan Yang RN

## 2017-11-07 NOTE — TELEPHONE ENCOUNTER
Obstetric Phone Triage- [facility]    **REMEMBER: Review patient's prenatal record including complications with pregnancy and medications patient may be on (insulins, tocolytic, etc.)**      2017 9:09 PM  Marisol Monroy 1995   Home Phone [phone]   Mobile [phone]                       Last office visit/Cervix exam: 17   EDC 10-25-17 Gestational Age 41w5d weeks    Spoke with Marisol  Patient lives 2 miles away    Reason for call per pt: back pain    Is your provider aware of this concern? no    Did you have any complications with this or a previous pregnancy? (Pre-term labor, C/S, Previa, pre-eclampsia, diabetes) none found on problem list, pt denies    Are you having contractions? Pt unsure, back pain that comes and goes   -If yes: Contractions irregular, more back pain that comes and goes rates 9/10 at worst     When did they start? 20 minutes ago    Have you palpated your contractions? Yes, some stomach tightening    Describe contraction discomfort (ex: having to breath through    contractions, stop what you are doing, etc.): uncomfortable, back pain painful     Vaginal/rectal pressure: no    What types of activity have you done in the past 24 hours? normal    Have you had intercourse/anything in the vagina in the last 48 hours?   Yes, SVE    Are you being treated for any vaginal infections? no    How much fluids have you been drinking? Normal     Are you having any bloody show/Bleeding? No   -If yes: Amount: none    Color:     [place]/anything vaginally in the last 24-48 hours? Yes, SVE            Are you having any new vaginal discharge or are you leaking fluids/has your water broken? no   -If yes: Amount:none      Is your baby moving normally? Yes, normal all day not as active last 20 minutes        Additional Concerns (abd pain, headache, swelling, visual changes, etc): back pain    Patient Advised: You are always more than welcome to come in and be evaluated. If you decide to  stay home, monitor pain and contractions, if they do not subside or worsen call back within 45 minutes to an hour    Call taken by: Maya Dinero RN

## 2017-11-07 NOTE — PLAN OF CARE
Problem: Postpartum (Vaginal Delivery) (Adult,Obstetrics,Pediatric)  Goal: Signs and Symptoms of Listed Potential Problems Will be Absent, Minimized or Managed (Postpartum)  Signs and symptoms of listed potential problems will be absent, minimized or managed by discharge/transition of care (reference Postpartum (Vaginal Delivery) (Adult,Obstetrics,Pediatric) CPG).   Outcome: Improving    11/07/17 0550   Postpartum (Vaginal Delivery)   Problems Assessed (Postpartum Vaginal Delivery) all   Problems Present (Postpartum Vag Deliv) none

## 2017-11-07 NOTE — PLAN OF CARE
Problem: Patient Care Overview  Goal: Plan of Care/Patient Progress Review  Outcome: Improving  Assessments completed as charted. B/P: 128/76, T: 97.7, P: 70, R: 18. Rates pain: 0/10. Voiding without difficulty. Fundus: Midline, U-1. Lochia: Light. Activity: normal activity. Infant feeding: Breast feeding going well.      LATCH Score:   Latch: 2 - Good Latch  Audible Swallowin - Spontaneous & frequent  Type of Nipple: (Breast/Nipple) 2 - Everted  Comfort: 2 - Soft, Nontender  Hold: 2 - No Assist   Total LATCH Score: 10     Postpartum breastfeeding assessment completed and education provided, see Patient Education Activity.  Items included in the education are:     proper positioning and latch    effectiveness of feeding    manual expression    handling and storing breastmilk    maintenance of breastfeeding for the first 6 months    sign/symptoms of infant feeding issues requiring referral to qualified health care provider  Postpartum care education provided, see Patient Education activity. Patient denies needs. Will monitor.  Meagan Yang RN

## 2017-11-07 NOTE — PLAN OF CARE
OB Triage Note  Marisol Monroy  MRN: [id number]  Gestational Age: 41w6d      Marisol Monroy presents for s/s of labor (sign/symptom/concern).      States jewel since .  Rates pain at 9/10.  Bleeding: none.  Denies LOF.  Reports none.    Dr. Mendez notified of arrival and condition.  Oriented patient to surroundings. Call light within reach.     FHT: 120  NST Start Time:2329  NST Stop Time:2349  NST: Reactive.  Uterine Assessment:Contractions: frequency q 3-5 minutes of mild quality.    Plan:  -Initial NST, then fetal/uterine monitoring per MD/patient plan.  -Sterile vaginal exam/sterile speculum exam.  -Nursing education on signs and symptoms of labor and inpatient provided.

## 2017-11-07 NOTE — L&D DELIVERY NOTE
"Delivery Summary    Mairsol Monroy MRN# [id number]   Age: 22 year old YOB: 1995     ASSESSMENT & PLAN:  precipitous labor controled delivery with right inner labial tear unrepaired        Labor Length    3rd Stage (hrs):  0 (min):  4      Labor Events     labor?:  No    steroids:  None   Labor Type:  Spontaneous      Antibiotics received during labor?:  No      Rupture date/time:     Rupture type:  Spontaneous rupture of membranes occuring during spontaneous labor or augmentation   Fluid color:  Clear   Fluid odor:  Normal      1:1 continuous labor support provided by?:  RN Labor partogram used?:  no         Delivery/Placenta Date and Time    Delivery Date:  17 Delivery Time:   2:27 AM   Placenta Date/Time:  2017  2:31 AM      Vaginal Counts        Needles Suture Grant Sponges Instruments   Initial counts       Added to count       Final counts          Placed during labor Accounted for at the end of labor      Final count performed by 2 team members:   Two Team Members   EJ         Final count correct?:  Yes         Apgars    Living status:  Living    1 Minute 5 Minute 10 Minute 15 Minute 20 Minute   Skin color: 1  1       Heart rate: 2  2       Reflex irritability: 2  2       Muscle tone: 1  2       Respiratory effort: 2  2       Total: 8  9          Apgars assigned by:  GABRIELLA MOSS RN      Cord    Vessels:  3 Vessels Complications:  None   Cord Blood Disposition:  Lab Gases Sent?:  No         Muse Resuscitation    Methods:  None      Muse Care at Delivery:  Vigorous baby to mom's abdomen   Output in Delivery Room:  Stool      Muse Measurements    Weight:  7 lb 10.1 oz Length:  1' 8\"      Skin to Skin and Feeding Plan    Skin to skin initiation date/time:     Skin to skin with:  Mother   Skin to skin end date/time:     How do you plan to feed your baby:  Breastfeeding      Labor Events and Shoulder Dystocia    Fetal Tracing Prior to Delivery:  Category 1 "   Shoulder dystocia present?:  Neg            Delivery (Maternal) (Provider to Complete) (038672)    Episiotomy:  None   Perineal lacerations:  None    Periurethral laceration:  right Repaired?:  No   Labial laceration:  right Repaired?:  No   Vaginal laceration?:  No    Cervical laceration?:  No    Est. blood loss (mL):  150         Mother's Information  Mother: Marisol Monroy #[id number]    Start of Mother's Information     IO Blood Loss  11/06/17 1427 - 11/07/17 0317    Mom's I/O Activity            End of Mother's Information  Mother: Marisol Monroy #[id number]            Delivery - Provider to Complete (417416)    Delivering clinician:  DIGNA MENDEZ   Attempted Delivery Types (Choose all that apply):  Spontaneous Vaginal Delivery   Delivery Type (Choose the 1 that will go to the Birth History):  Vaginal, Spontaneous Delivery                           Placenta    Delayed Cord Clamping:  Done   Date/Time:  11/7/2017  2:31 AM   Removal:  Spontaneous   Disposition:  Hospital disposal      Anesthesia    Method:  TOPICAL         Presentation and Position    Presentation:  Vertex   Position:  Left Occiput Anterior                    Digna Mendez MD, MD

## 2017-11-07 NOTE — PLAN OF CARE
Labor Admission  Marisol Monroy  MRN: [id number]  Gestational Age: 41w6d      Marisol Monroy is admitted for active labor.  States jewel since .  Rates pain at 9/10.  Denies LOF.  Reports scant  clear fluid seen.    Dr. Mendez notified of arrival and condition and Intrapartum orders initiated.      FHT: 120  NST: Reactive .  Uterine Assessment: frequency q 3-5 minutes, Contractionsf mild quality.     Patient is alert and oriented X 3,Patient oriented to room, unit, hourly rounding, and plan of care.  Call light within reach. Explained admission packet with patient bill of rights brochure. Will continue to monitor and document as needed.     Inpatient nursing criteria listed below was met:    Health care directives status obtained and documented: Yes  Patient identifies a surrogate decision maker: Yes   If yes, who: Niko  Core Measure diagnosis present:: No  Vaccine assessment done and vaccines ordered if appropriate. Yes  Clergy visit ordered if patient requests: N/A, pt declines  Skin issues/needs documented:N/A  Isolation needs addressed, if appropriate: N/A  Fall Prevention (Med and High risk): Care plan updated, Education given and documented and signage used: N/A  Care Plan initiated: Yes  Education Documented (Reminder to educate patient if MRSA is present on admission): Yes  Education Assessment documented:Yes  Patient has discharge needs (If yes, please explain): No

## 2017-11-07 NOTE — H&P
Late entry  ADMISSION NOTE FOR Marisol Monroy on 2017.    H and P unchanged from prenatal  Lungs clear  Heart RRR    Marisol Monroy is a 22 year old female  41w6d  Estimated Date of Delivery: Data Unavailable is calculated from No LMP recorded. [facility] 2017 at 6:59 AM  in active labor.     Membranes are grossly ruptured      PRENATAL COURSE   Prenatal vital signs WNL   Prenatal course was essentially uncomplicated     HISTORIES   No Known Allergies   No past medical history on file.   Past Surgical History:   Procedure Laterality Date     LAPAROSCOPIC EVACUATION ECTOPIC PREGNANCY Right 2016    Procedure: LAPAROSCOPIC EVACUATION ECTOPIC PREGNANCY;  Surgeon: Basil Pabon MD;  Location: [facility]      Family History   Problem Relation Age of Onset     Hypertension Brother      Hypertension Mother       Social History   Substance Use Topics     Smoking status: Passive Smoke Exposure - Never Smoker     Smokeless tobacco: Never Used     Alcohol use Yes      Comment: rare; social use      Obstetric History       T1      L1     SAB0   TAB0   Ectopic1   Multiple0   Live Births1       # Outcome Date GA Lbr Juni/2nd Weight Sex Delivery Anes PTL Lv   2 Term 17 41w6d  3.46 kg (7 lb 10.1 oz) M Vag-Spont TOPICAL N KVNG      Name: NOAH MONROY      Complications: Labor, precipitous      Apgar1:  8                Apgar5: 9   1 Ectopic 16     ECTOPIC              LABS:     Lab Results   Component Value Date    ABO O 2016           Lab Results   Component Value Date    RH  Pos 2016      Rhogam not indicated   No results found for: RUBELLAABIGG   No results found for: RPR   No results found for: HIV   Lab Results   Component Value Date    HGB 11.2 2017      Lab Results   Component Value Date    HEPBANG Nonreactive 2015      Lab Results   Component Value Date    GBS Negative 10/31/2017      Other significant lab:    None.      PHYSICAL EXAM:     /78  Pulse 70  Temp 99.6  F (37.6  C) (Oral)  Resp 16  SpO2 97%  Breastfeeding? Unknown  Neuro: denies headache and visual disturbances   Edema n Reflexes normal     Abdomen: gravid, single vertex fetus, non-tender, EFW 7     FETAL HEART TONES cat I  Cervix c    ASSESSMENT:   IUP @ 41w6d in active labor.  NST reactive.      PLAN:   DION Mendez MD

## 2017-11-07 NOTE — PLAN OF CARE
Marisol Monroy        NST:  reactive  Start: 2329  Stop: 2349    Physician: Dr. Mendez  Reason For Test: ROL  EDC: 10-15-17  Gestational Age: 41w6d          Maya Dinero

## 2017-11-08 VITALS
TEMPERATURE: 98.2 F | HEART RATE: 70 BPM | SYSTOLIC BLOOD PRESSURE: 129 MMHG | DIASTOLIC BLOOD PRESSURE: 71 MMHG | RESPIRATION RATE: 16 BRPM | OXYGEN SATURATION: 97 %

## 2017-11-08 PROCEDURE — 25000132 ZZH RX MED GY IP 250 OP 250 PS 637: Performed by: OBSTETRICS & GYNECOLOGY

## 2017-11-08 RX ADMIN — IBUPROFEN 800 MG: 800 TABLET ORAL at 05:09

## 2017-11-08 NOTE — PLAN OF CARE
Face to face report given with opportunity to observe patient.    Report given to Maya Yang RN  11/7/2017  7:19 PM

## 2017-11-08 NOTE — PROGRESS NOTES
Patient:   Marisol     Met with Marisol this morning, she was holding baby and her  was in the room sleeping during assessment. Marisol says she has a strong support system, and has everything she needs including diapers, crib, and a carseat. She plans to breastfeed at home, she states she does not have a pump at home. Updated [facility] nurse that patient would like a prescription for pump.      Lives at: home   Lives with:    Support System: yes     Primary PCP:  Is able to establish on her own  OB:  Dr. Mendez   Baby PCP: Dr. Swanson  Insurance: yes   Pregnancy Hx:  None prior         Agency Contacts: No  Mental Health: denies  Violence: denies  Substance Abuse: denies    Adequate resources for needs (housing, utilities, food/med): Yes    Transportation:    Car Seat: Yes  Breast Pump:  No - Need Prescription  Formula: No  Diapers:  Yes  Crib or Bassinet: Has a crib, and plans to buy a bassinet today    Education concerns on self/baby care:   Denies     Community Resources Given to patient  Patient is connected with WIC currently.

## 2017-11-08 NOTE — PLAN OF CARE
Face to face report given with opportunity to observe patient.    Report given to Mica Dinero   11/8/2017  7:24 AM

## 2017-11-08 NOTE — DISCHARGE SUMMARY
[facility]   Obstetrics PostPartum  / Progress Note/final note  Post Partum day 1   Subjective:  sore   Breast feeding: y          Medications:   All medications related to the patient's hospitalization have been reviewed           Physical Exam:   Awake alert with appropriate affect    /67  Pulse 70  Temp 98.4  F (36.9  C) (Oral)  Resp 16  SpO2 96%  Breastfeeding? Unknown    Fundus : firm   Legs: negative Shagufta's              Data:   All laboratory data related to this ob reviewed    Hemoglobin   Date Value Ref Range Status   2017 11.5 (L) 11.7 - 15.7 g/dL Final   ]  Lab Results   Component Value Date    ABO O 2017      Lab Results   Component Value Date    RH Pos 2017            Assessment and Plan:    Assessment:   Ppd 1 stable           Plan:   Home with instructions  Has appt  otc meds only               Digna Mendez MD

## 2017-11-08 NOTE — PLAN OF CARE
Problem: Patient Care Overview  Goal: Plan of Care/Patient Progress Review  Outcome: Improving  Assessments completed as charted. B/P: 129/71, T: 98.2, P: 70, R: 16. Rates pain: 0/10 . Voiding without difficulty. Fundus: Midline, U-1. Lochia: Light. Activity: normal activity. Infant feeding: Breast feeding going well.      LATCH Score:   Latch: 2 - Good Latch  Audible Swallowin - Spontaneous & frequent  Type of Nipple: (Breast/Nipple) 2 - Everted  Comfort: 2 - Soft, Nontender  Hold: 2 - No Assist   Total LATCH Score: 10     Postpartum breastfeeding assessment completed and education provided, see Patient Education Activity.  Items included in the education are:     proper positioning and latch    effectiveness of feeding    manual expression    handling and storing breastmilk    maintenance of breastfeeding for the first 6 months    sign/symptoms of infant feeding issues requiring referral to qualified health care provider  Postpartum care education provided, see Patient Education activity. Patient denies needs. Will monitor.  Meagan Yang RN

## 2017-11-08 NOTE — PLAN OF CARE
Problem: Patient Care Overview  Goal: Plan of Care/Patient Progress Review  Outcome: Improving    17 001   OTHER   Plan Of Care Reviewed With patient   Plan of Care Review   Progress improving      Assessments completed as charted. B/P: 126/67, T: 98.4, P: 70, R: 16. Rates pain: 0/10. Voiding without difficulty. Fundus: Midline, firm . Lochia: Light. Activity: unrestricted with out pain . Infant feeding: Breast feeding going well.      LATCH Score:   Latch: 2 - Good Latch  Audible Swallowin - Spontaneous & frequent  Type of Nipple: (Breast/Nipple) 2 - Everted  Comfort: 2 - Soft, Nontender  Hold: 2 - No Assist   Total LATCH Score:      Postpartum breastfeeding assessment completed and education provided, see Patient Education Activity.  Items included in the education are:     proper positioning and latch    effectiveness of feeding    manual expression    handling and storing breastmilk    maintenance of breastfeeding for the first 6 months    sign/symptoms of infant feeding issues requiring referral to qualified health care provider  Postpartum care education provided, see Patient Education activity. Patient denies needs. Will monitor.  Maya Dinero           Problem: Postpartum (Vaginal Delivery) (Adult,Obstetrics,Pediatric)  Goal: Signs and Symptoms of Listed Potential Problems Will be Absent, Minimized or Managed (Postpartum)  Signs and symptoms of listed potential problems will be absent, minimized or managed by discharge/transition of care (reference Postpartum (Vaginal Delivery) (Adult,Obstetrics,Pediatric) CPG).   Outcome: Improving    17   Postpartum (Vaginal Delivery)   Problems Assessed (Postpartum Vaginal Delivery) all   Problems Present (Postpartum Vag Deliv) none

## 2017-11-08 NOTE — PLAN OF CARE
Patient discharged at 10:30 AM via ambulation accompanied by spouse and staff. Prescriptions - None ordered for discharge. All belongings sent with patient.     Discharge instructions reviewed with patient. Listed belongings gathered and returned to patient. yes    Patient discharged to home.     Core Measures and Vaccines  Core Measures applicable during stay: no  Pneumonia and Influenza given prior to discharge, if indicated: NA    Surgical Patient   Surgical Procedures during stay: no  Did patient receive discharge instruction on wound care and recognition of infection symptoms? N/A    MISC  Follow up appointment made:  Yes  Home and hospital aquired medications returned to patient: N/A  Patient reports pain was well managed at discharge: Yes

## 2017-11-20 ENCOUNTER — PRENATAL OFFICE VISIT (OUTPATIENT)
Dept: OBGYN | Facility: OTHER | Age: 22
End: 2017-11-20
Attending: OBSTETRICS & GYNECOLOGY
Payer: COMMERCIAL

## 2017-11-20 VITALS
SYSTOLIC BLOOD PRESSURE: 108 MMHG | DIASTOLIC BLOOD PRESSURE: 68 MMHG | WEIGHT: 131 LBS | BODY MASS INDEX: 22.36 KG/M2 | HEART RATE: 76 BPM | HEIGHT: 64 IN

## 2017-11-20 DIAGNOSIS — Z30.09 FAMILY PLANNING: Primary | ICD-10-CM

## 2017-11-20 PROCEDURE — 99207 ZZC NO CHARGE LOS: CPT | Performed by: OBSTETRICS & GYNECOLOGY

## 2017-11-20 RX ORDER — ACETAMINOPHEN AND CODEINE PHOSPHATE 120; 12 MG/5ML; MG/5ML
1 SOLUTION ORAL DAILY
Qty: 84 TABLET | Refills: 4 | Status: SHIPPED | OUTPATIENT
Start: 2017-11-20 | End: 2018-04-17

## 2017-11-20 ASSESSMENT — ANXIETY QUESTIONNAIRES
IF YOU CHECKED OFF ANY PROBLEMS ON THIS QUESTIONNAIRE, HOW DIFFICULT HAVE THESE PROBLEMS MADE IT FOR YOU TO DO YOUR WORK, TAKE CARE OF THINGS AT HOME, OR GET ALONG WITH OTHER PEOPLE: NOT DIFFICULT AT ALL
6. BECOMING EASILY ANNOYED OR IRRITABLE: NOT AT ALL
3. WORRYING TOO MUCH ABOUT DIFFERENT THINGS: NOT AT ALL
7. FEELING AFRAID AS IF SOMETHING AWFUL MIGHT HAPPEN: NOT AT ALL
1. FEELING NERVOUS, ANXIOUS, OR ON EDGE: NOT AT ALL
GAD7 TOTAL SCORE: 0
5. BEING SO RESTLESS THAT IT IS HARD TO SIT STILL: NOT AT ALL
2. NOT BEING ABLE TO STOP OR CONTROL WORRYING: NOT AT ALL

## 2017-11-20 ASSESSMENT — PATIENT HEALTH QUESTIONNAIRE - PHQ9
5. POOR APPETITE OR OVEREATING: NOT AT ALL
SUM OF ALL RESPONSES TO PHQ QUESTIONS 1-9: 1

## 2017-11-20 NOTE — MR AVS SNAPSHOT
"              After Visit Summary   11/20/2017    Marisol Monroy    MRN: [id number]           Patient Information     Date Of Birth          1995        Visit Information        Provider Department      11/20/2017 2:40 PM Digna Mendez MD [facility]        Today's Diagnoses     Family planning    -  1       Follow-ups after your visit        Your next 10 appointments already scheduled     Nov 20, 2017  2:40 PM CST   (Arrive by 2:25 PM)   Post Partum with Digna Mendez MD   [facility] [place] ([facility] [place] )    [address] [place] [address]   [phone]              Who to contact     If you have questions or need follow up information about today's clinic visit or your schedule please contact [facility] directly at [phone].  Normal or non-critical lab and imaging results will be communicated to you by MyChart, letter or phone within 4 business days after the clinic has received the results. If you do not hear from us within 7 days, please contact the clinic through MyChart or phone. If you have a critical or abnormal lab result, we will notify you by phone as soon as possible.  Submit refill requests through [employer] or call your pharmacy and they will forward the refill request to us. Please allow 3 business days for your refill to be completed.          Additional Information About Your Visit        MyChart Information     [employer] lets you send messages to your doctor, view your test results, renew your prescriptions, schedule appointments and more. To sign up, go to [email or web address] . Click on \"Log in\" on the left side of the screen, which will take you to the Welcome page. Then click on \"Sign up Now\" on the right side of the page.     You will be asked to enter the access code listed below, as well as some personal information. Please follow the directions to create your username and password.     Your access code is: " "[email or web address]  Expires: 2018  4:24 PM     Your access code will  in 90 days. If you need help or a new code, please call your [facility] clinic or [phone].        Care EveryWhere ID     This is your Care EveryWhere ID. This could be used by other organizations to access your [facility] medical records  [id number]        Your Vitals Were     Pulse Height Breastfeeding? BMI (Body Mass Index)          76 5' 4\" (1.626 m) Yes 22.49 kg/m2         Blood Pressure from Last 3 Encounters:   17 108/68   17 129/71   17 128/76    Weight from Last 3 Encounters:   17 131 lb (59.4 kg)   17 150 lb (68 kg)   17 150 lb (68 kg)              Today, you had the following     No orders found for display         Today's Medication Changes          These changes are accurate as of: 17  2:36 PM.  If you have any questions, ask your nurse or doctor.               Start taking these medicines.        Dose/Directions    norethindrone 0.35 MG per tablet   Commonly known as:  MICRONOR   Used for:  Family planning   Started by:  Digna Mendez MD        Dose:  1 tablet   Take 1 tablet (0.35 mg) by mouth daily   Quantity:  84 tablet   Refills:  4            Where to get your medicines      These medications were sent to [facility] [address] [place] - [address] [address] [address] [address], [facility] [address]     Phone:  [phone]     norethindrone 0.35 MG per tablet                Primary Care Provider    Physician No Ref-Primary       NO REF-PRIMARY PHYSICIAN        Equal Access to Services     [facility] AH: Hadii amy barone hadasho Soomaali, waaxda luqadaha, qaybta kaalmada sofiya kraus. So [facility] [phone].    ATENCIÓN: Si habla español, tiene a anton disposición servicios gratuitos de asistencia lingüística. Jaileneame al [phone].    We comply with applicable federal civil rights laws and [place] laws. We do not discriminate on the basis of race, color, " national origin, age, disability, sex, sexual orientation, or gender identity.            Thank you!     Thank you for choosing [facility]  for your care. Our goal is always to provide you with excellent care. Hearing back from our patients is one way we can continue to improve our services. Please take a few minutes to complete the written survey that you may receive in the mail after your visit with us. Thank you!             Your Updated Medication List - Protect others around you: Learn how to safely use, store and throw away your medicines at www.disposemymeds.org.          This list is accurate as of: 11/20/17  2:36 PM.  Always use your most recent med list.                   Brand Name Dispense Instructions for use Diagnosis    acetaminophen 500 MG tablet    TYLENOL    60 tablet    Take 1-2 tablets (500-1,000 mg) by mouth every 6 hours as needed for mild pain        norethindrone 0.35 MG per tablet    MICRONOR    84 tablet    Take 1 tablet (0.35 mg) by mouth daily    Family planning       prenatal multivitamin plus iron 27-0.8 MG Tabs per tablet      Take 1 tablet by mouth daily

## 2017-11-20 NOTE — PROGRESS NOTES
"Marisol Monroy is a 22 year old female  /68  Pulse 76  Ht 5' 4\" (1.626 m)  Wt 131 lb (59.4 kg)  Breastfeeding? Yes  BMI 22.49 kg/m2  Generally well. Defers hep B again  Breast feeding:  y        Baby name: Helder   Spontaneous vaginal delivery: y   Stitches: n  PHQ9:  1  Bleeding:  Lightened up  Vulva:  kegels  Family planning:  pill  Other concerns:  no    Assessment:  Family planning    Plan: micronor to [facility] done  Return To Office: 4 wks for exam    Greater than 15 minutes were spent with this patient  Digna Mendez MD    "

## 2017-11-20 NOTE — NURSING NOTE
"Chief Complaint   Patient presents with     Contraception       Initial /68  Pulse 76  Ht 5' 4\" (1.626 m)  Wt 131 lb (59.4 kg)  Breastfeeding? Yes  BMI 22.49 kg/m2 Estimated body mass index is 22.49 kg/(m^2) as calculated from the following:    Height as of this encounter: 5' 4\" (1.626 m).    Weight as of this encounter: 131 lb (59.4 kg).  Medication Reconciliation: complete     NAT HANNA      "

## 2017-11-21 ASSESSMENT — ANXIETY QUESTIONNAIRES: GAD7 TOTAL SCORE: 0

## 2017-12-12 ENCOUNTER — PRENATAL OFFICE VISIT (OUTPATIENT)
Dept: OBGYN | Facility: OTHER | Age: 22
End: 2017-12-12
Attending: OBSTETRICS & GYNECOLOGY
Payer: COMMERCIAL

## 2017-12-12 VITALS
SYSTOLIC BLOOD PRESSURE: 104 MMHG | HEIGHT: 64 IN | DIASTOLIC BLOOD PRESSURE: 68 MMHG | WEIGHT: 135 LBS | BODY MASS INDEX: 23.05 KG/M2 | HEART RATE: 72 BPM

## 2017-12-12 DIAGNOSIS — Z30.09 FAMILY PLANNING: Primary | ICD-10-CM

## 2017-12-12 PROCEDURE — 99207 ZZC POST PARTUM EXAM: CPT | Performed by: OBSTETRICS & GYNECOLOGY

## 2017-12-12 ASSESSMENT — PATIENT HEALTH QUESTIONNAIRE - PHQ9
SUM OF ALL RESPONSES TO PHQ QUESTIONS 1-9: 2
5. POOR APPETITE OR OVEREATING: NOT AT ALL

## 2017-12-12 ASSESSMENT — ANXIETY QUESTIONNAIRES
GAD7 TOTAL SCORE: 0
5. BEING SO RESTLESS THAT IT IS HARD TO SIT STILL: NOT AT ALL
7. FEELING AFRAID AS IF SOMETHING AWFUL MIGHT HAPPEN: NOT AT ALL
1. FEELING NERVOUS, ANXIOUS, OR ON EDGE: NOT AT ALL
2. NOT BEING ABLE TO STOP OR CONTROL WORRYING: NOT AT ALL
3. WORRYING TOO MUCH ABOUT DIFFERENT THINGS: NOT AT ALL
6. BECOMING EASILY ANNOYED OR IRRITABLE: NOT AT ALL

## 2017-12-12 NOTE — MR AVS SNAPSHOT
"              After Visit Summary   2017    Marisol Monroy    MRN: [id number]           Patient Information     Date Of Birth          1995        Visit Information        Provider Department      2017 9:40 AM Digna Mendez MD [facility]        Today's Diagnoses     Family planning    -  1      Care Instructions    Return for annual exam.            Follow-ups after your visit        Who to contact     If you have questions or need follow up information about today's clinic visit or your schedule please contact [facility] directly at [phone].  Normal or non-critical lab and imaging results will be communicated to you by Camileon Heelshart, letter or phone within 4 business days after the clinic has received the results. If you do not hear from us within 7 days, please contact the clinic through [employer] or phone. If you have a critical or abnormal lab result, we will notify you by phone as soon as possible.  Submit refill requests through [employer] or call your pharmacy and they will forward the refill request to us. Please allow 3 business days for your refill to be completed.          Additional Information About Your Visit        MyChart Information     [employer] lets you send messages to your doctor, view your test results, renew your prescriptions, schedule appointments and more. To sign up, go to [email or web address] . Click on \"Log in\" on the left side of the screen, which will take you to the Welcome page. Then click on \"Sign up Now\" on the right side of the page.     You will be asked to enter the access code listed below, as well as some personal information. Please follow the directions to create your username and password.     Your access code is: [email or web address]  Expires: 2018  4:24 PM     Your access code will  in 90 days. If you need help or a new code, please call your [facility] or [phone].        Care EveryWhere ID     This is your Care " "EveryWhere ID. This could be used by other organizations to access your [place] medical records  [id number]        Your Vitals Were     Pulse Height Breastfeeding? BMI (Body Mass Index)          72 5' 4\" (1.626 m) Yes 23.17 kg/m2         Blood Pressure from Last 3 Encounters:   12/12/17 104/68   11/20/17 108/68   11/08/17 129/71    Weight from Last 3 Encounters:   12/12/17 135 lb (61.2 kg)   11/20/17 131 lb (59.4 kg)   11/06/17 150 lb (68 kg)              Today, you had the following     No orders found for display       Primary Care Provider Fax #    Physician No Ref-Primary [phone]       No address on file        Equal Access to Services     NAN RICHMON[place] : Alexandru Snell, waaxda luqadaha, qaybta kaalmada radhayaoleg, sofiya angeles . So [facility] [phone].    ATENCIÓN: Si habla español, tiene a anton disposición servicios gratuitos de asistencia lingüística. Llame al [phone].    We comply with applicable federal civil rights laws and [place] laws. We do not discriminate on the basis of race, color, national origin, age, disability, sex, sexual orientation, or gender identity.            Thank you!     Thank you for choosing [facility]  for your care. Our goal is always to provide you with excellent care. Hearing back from our patients is one way we can continue to improve our services. Please take a few minutes to complete the written survey that you may receive in the mail after your visit with us. Thank you!             Your Updated Medication List - Protect others around you: Learn how to safely use, store and throw away your medicines at www.disposemymeds.org.          This list is accurate as of: 12/12/17 10:15 AM.  Always use your most recent med list.                   Brand Name Dispense Instructions for use Diagnosis    norethindrone 0.35 MG per tablet    MICRONOR    84 tablet    Take 1 tablet (0.35 mg) by mouth daily    Family planning       " prenatal multivitamin plus iron 27-0.8 MG Tabs per tablet      Take 1 tablet by mouth daily

## 2017-12-12 NOTE — NURSING NOTE
"Chief Complaint   Patient presents with     Post Partum Exam       Initial /68  Pulse 72  Ht 5' 4\" (1.626 m)  Wt 135 lb (61.2 kg)  Breastfeeding? Yes  BMI 23.17 kg/m2 Estimated body mass index is 23.17 kg/(m^2) as calculated from the following:    Height as of this encounter: 5' 4\" (1.626 m).    Weight as of this encounter: 135 lb (61.2 kg).  Medication Reconciliation: complete   MARIELENA CARLISLE      "

## 2017-12-12 NOTE — PROGRESS NOTES
"Marisol Monroy is a 22 year old female is 6 weeks post partum  /68  Pulse 72  Ht 5' 4\" (1.626 m)  Wt 135 lb (61.2 kg)  Breastfeeding? Yes  BMI 23.17 kg/m2  Generally well  Breast feeding:  both        Baby name: Helder   Spontaneous vaginal delivery: y  Stitches: n   PHQ9:  0  Bleeding:  stopped  Vulva:  kegels  Family planning:  micronor  Other concerns:  n    Pelvic:  Vagina and vulva are normal; well healed, no discharge is noted.    Cervix: normal without lesions.    Uterus:   mobile,  normal in size and shape without tenderness.  Adnexa: without masses or tenderness.    Assessment:  Pp exam    Plan:   rto annual    Greater than 15 minutes were spent with this patient  Digna Mendez MD    "

## 2017-12-13 ASSESSMENT — ANXIETY QUESTIONNAIRES: GAD7 TOTAL SCORE: 0

## 2018-04-17 ENCOUNTER — PRENATAL OFFICE VISIT (OUTPATIENT)
Dept: OBGYN | Facility: OTHER | Age: 23
End: 2018-04-17
Attending: OBSTETRICS & GYNECOLOGY
Payer: COMMERCIAL

## 2018-04-17 VITALS
SYSTOLIC BLOOD PRESSURE: 102 MMHG | HEIGHT: 64 IN | WEIGHT: 134 LBS | BODY MASS INDEX: 22.88 KG/M2 | DIASTOLIC BLOOD PRESSURE: 62 MMHG | HEART RATE: 68 BPM

## 2018-04-17 DIAGNOSIS — F32.A ANXIETY AND DEPRESSION: ICD-10-CM

## 2018-04-17 DIAGNOSIS — L65.9 HAIR LOSS: ICD-10-CM

## 2018-04-17 DIAGNOSIS — R53.83 FATIGUE, UNSPECIFIED TYPE: ICD-10-CM

## 2018-04-17 DIAGNOSIS — N92.0 EXCESSIVE OR FREQUENT MENSTRUATION: ICD-10-CM

## 2018-04-17 DIAGNOSIS — Z30.09 FAMILY PLANNING: Primary | ICD-10-CM

## 2018-04-17 DIAGNOSIS — F41.9 ANXIETY AND DEPRESSION: ICD-10-CM

## 2018-04-17 LAB
ERYTHROCYTE [DISTWIDTH] IN BLOOD BY AUTOMATED COUNT: 12.4 % (ref 10–15)
HCT VFR BLD AUTO: 42.2 % (ref 35–47)
HGB BLD-MCNC: 14.8 G/DL (ref 11.7–15.7)
MCH RBC QN AUTO: 29.5 PG (ref 26.5–33)
MCHC RBC AUTO-ENTMCNC: 35.1 G/DL (ref 31.5–36.5)
MCV RBC AUTO: 84 FL (ref 78–100)
PLATELET # BLD AUTO: 291 10E9/L (ref 150–450)
RBC # BLD AUTO: 5.02 10E12/L (ref 3.8–5.2)
TSH SERPL DL<=0.005 MIU/L-ACNC: 1.88 MU/L (ref 0.4–4)
WBC # BLD AUTO: 5.2 10E9/L (ref 4–11)

## 2018-04-17 PROCEDURE — 85027 COMPLETE CBC AUTOMATED: CPT | Performed by: OBSTETRICS & GYNECOLOGY

## 2018-04-17 PROCEDURE — 99214 OFFICE O/P EST MOD 30 MIN: CPT | Performed by: OBSTETRICS & GYNECOLOGY

## 2018-04-17 PROCEDURE — 36415 COLL VENOUS BLD VENIPUNCTURE: CPT | Performed by: OBSTETRICS & GYNECOLOGY

## 2018-04-17 PROCEDURE — 86038 ANTINUCLEAR ANTIBODIES: CPT | Mod: 90 | Performed by: OBSTETRICS & GYNECOLOGY

## 2018-04-17 PROCEDURE — 82306 VITAMIN D 25 HYDROXY: CPT | Mod: 90 | Performed by: OBSTETRICS & GYNECOLOGY

## 2018-04-17 PROCEDURE — 84443 ASSAY THYROID STIM HORMONE: CPT | Performed by: OBSTETRICS & GYNECOLOGY

## 2018-04-17 PROCEDURE — 99000 SPECIMEN HANDLING OFFICE-LAB: CPT | Performed by: OBSTETRICS & GYNECOLOGY

## 2018-04-17 RX ORDER — NORETHINDRONE ACETATE AND ETHINYL ESTRADIOL 1MG-20(21)
KIT ORAL
Qty: 112 TABLET | Refills: 4 | Status: SHIPPED | OUTPATIENT
Start: 2018-04-17 | End: 2021-09-23

## 2018-04-17 ASSESSMENT — ANXIETY QUESTIONNAIRES
2. NOT BEING ABLE TO STOP OR CONTROL WORRYING: MORE THAN HALF THE DAYS
1. FEELING NERVOUS, ANXIOUS, OR ON EDGE: NEARLY EVERY DAY
GAD7 TOTAL SCORE: 14
5. BEING SO RESTLESS THAT IT IS HARD TO SIT STILL: SEVERAL DAYS
IF YOU CHECKED OFF ANY PROBLEMS ON THIS QUESTIONNAIRE, HOW DIFFICULT HAVE THESE PROBLEMS MADE IT FOR YOU TO DO YOUR WORK, TAKE CARE OF THINGS AT HOME, OR GET ALONG WITH OTHER PEOPLE: SOMEWHAT DIFFICULT
3. WORRYING TOO MUCH ABOUT DIFFERENT THINGS: MORE THAN HALF THE DAYS
7. FEELING AFRAID AS IF SOMETHING AWFUL MIGHT HAPPEN: NEARLY EVERY DAY
6. BECOMING EASILY ANNOYED OR IRRITABLE: MORE THAN HALF THE DAYS

## 2018-04-17 ASSESSMENT — PATIENT HEALTH QUESTIONNAIRE - PHQ9: 5. POOR APPETITE OR OVEREATING: SEVERAL DAYS

## 2018-04-17 NOTE — MR AVS SNAPSHOT
After Visit Summary   4/17/2018    Marisol Monroy    MRN: [id number]           Patient Information     Date Of Birth          1995        Visit Information        Provider Department      4/17/2018 9:10 AM Digna Mendez MD [facility]        Today's Diagnoses     Family planning    -  1    Anxiety and depression        Excessive or frequent menstruation        Fatigue, unspecified type          Care Instructions    Labs today.  Active tablet daily oral contraceptives (skip placebo tablets)  Exercise.    Reschedule annual exam with Kirill Leon.      Psychologists/ counselors  [place]andra Carpent[place]  [phone]  Dr. Blair Warern [phone]  [facility]  [phone]  [facility] [phone]  Wili Nicholasin  [phone]   [facility]  [phone]  (kids)  [facility] [phone]  (teens)  [facility] Behavioral Health      [phone]  [facility] [phone]    [facility]     [phone]   [facility] counseling [phone]  [facility]  [phone]  Enrrique Nelson [phone]  Ember Mosher [phone]  Mar counseling     [phone]  Childrens behavioral/ adult family     [phone]  [facility] Psych/ Health & Wellness     [phone]  [facility]     [phone]  [place]  Chuckie Buckner  [phone]  [facility]     [phone]  [facility] Dr. AILEEN Scruggs     [phone]  [facility] Psychological Services     [phone]                        Follow-ups after your visit        Your next 10 appointments already scheduled     Jun 18, 2018 10:00 AM CDT   (Arrive by 9:45 AM)   Office Visit with Digna Mendez MD   [facility] ([facility] )    [address] [place] [address]   [phone]           Bring a current list of meds and any records pertaining to this visit. For Physicals, please  "bring immunization records and any forms needing to be filled out. Please arrive 10 minutes early to complete paperwork.              Who to contact     If you have questions or need follow up information about today's clinic visit or your schedule please contact [facility] [place] directly at [phone].  Normal or non-critical lab and imaging results will be communicated to you by MyChart, letter or phone within 4 business days after the clinic has received the results. If you do not hear from us within 7 days, please contact the clinic through MyChart or phone. If you have a critical or abnormal lab result, we will notify you by phone as soon as possible.  Submit refill requests through [employer] or call your pharmacy and they will forward the refill request to us. Please allow 3 business days for your refill to be completed.          Additional Information About Your Visit        MyChart Information     [employer] lets you send messages to your doctor, view your test results, renew your prescriptions, schedule appointments and more. To sign up, go to [email or web address] . Click on \"Log in\" on the left side of the screen, which will take you to the Welcome page. Then click on \"Sign up Now\" on the right side of the page.     You will be asked to enter the access code listed below, as well as some personal information. Please follow the directions to create your username and password.     Your access code is: [email or web address]  Expires: 2018 10:00 AM     Your access code will  in 90 days. If you need help or a new code, please call your [facility] or [phone].        Care EveryWhere ID     This is your Care EveryWhere ID. This could be used by other organizations to access your [place] medical records  [id number]        Your Vitals Were     Pulse Height BMI (Body Mass Index)             68 5' 4\" (1.626 m) 23 kg/m2          Blood Pressure from Last 3 Encounters:   18 102/62   17 " 104/68   11/20/17 108/68    Weight from Last 3 Encounters:   04/17/18 134 lb (60.8 kg)   12/12/17 135 lb (61.2 kg)   11/20/17 131 lb (59.4 kg)              We Performed the Following     Anti Nuclear Angie IgG by IFA with Reflex     CBC with platelets     TSH     Vitamin D Deficiency          Today's Medication Changes          These changes are accurate as of 4/17/18 10:00 AM.  If you have any questions, ask your nurse or doctor.               Start taking these medicines.        Dose/Directions    norethindrone-ethinyl estradiol 1-20 MG-MCG per tablet   Commonly known as:  MICROGESTIN FE 1/20   Used for:  Family planning, Anxiety and depression, Excessive or frequent menstruation, Fatigue, unspecified type   Started by:  Digna Mendez MD        Active pill daily for amenorrhea   Quantity:  112 tablet   Refills:  4         Stop taking these medicines if you haven't already. Please contact your care team if you have questions.     norethindrone 0.35 MG per tablet   Commonly known as:  MICRONOR   Stopped by:  Digna Mendez MD                Where to get your medicines      These medications were sent to [id number] - [place], [place] - [address]   [address] , [place] [address]     Phone:  [phone]     norethindrone-ethinyl estradiol 1-20 MG-MCG per tablet                Primary Care Provider Fax #    Physician No Ref-Primary [phone]       No address on file        Equal Access to Services     [facility] : Hadii amy shawo Somagdalena, waaxda luqadaha, qaybta kaalmada adeegyada, sofiya angeles . So [facility] [phone].    ATENCIÓN: Si habla español, tiene a anton disposición servicios gratuitos de asistencia lingüística. Jennifer schwab [phone].    We comply with applicable federal civil rights laws and [place] laws. We do not discriminate on the basis of race, color, national origin, age, disability, sex, sexual orientation, or gender identity.            Thank you!      Thank you [facility] choosing [facility]  for your care. Our goal is always to provide you with excellent care. Hearing back from our patients is one way we can continue to improve our services. Please take a few minutes to complete the written survey that you may receive in the mail after your visit with us. Thank you!             Your Updated Medication List - Protect others around you: Learn how to safely use, store and throw away your medicines at www.disposemymeds.org.          This list is accurate as of 4/17/18 10:00 AM.  Always use your most recent med list.                   Brand Name Dispense Instructions for use Diagnosis    norethindrone-ethinyl estradiol 1-20 MG-MCG per tablet    MICROGESTIN FE 1/20    112 tablet    Active pill daily for amenorrhea    Family planning, Anxiety and depression, Excessive or frequent menstruation, Fatigue, unspecified type

## 2018-04-17 NOTE — PROGRESS NOTES
"Marisol Monroy is a 22 year old female here for follow up for anxiety and depression. No suicidal plans.      O:   /62  Pulse 68  Ht 5' 4\" (1.626 m)  Wt 134 lb (60.8 kg)  BMI 23 kg/m2   aa    A:  Anxiety and depression  Hair loss  Menorrhagia  fatigue    P:  RE  For annual  Tsh,vit d,HP,MARIETTA  Change pills to BTB done  List of counselors  Refusing meds at this time  exercise    Greater than 25 minutes were spent face to face counseling this patient    Digna Mendez MD    "

## 2018-04-17 NOTE — PATIENT INSTRUCTIONS
Labs today.  Active tablet daily oral contraceptives (skip placebo tablets)  Exercise.    Reschedule annual exam with Kirill Leon.      Psychologists/ counselors  [place]andra [place]  [phone]  Dr. Blair Warren [phone]  [facility]  [phone]  [place] [phone]  Wili Martinez  [phone]   [facility]  [phone]  (kids)  [facility] [phone]  (teens)  [facility]al [facility]      [phone]  [facility] Mental [facility] [phone]    [facility]     [phone]   [facility] counseling [phone]  [facility]  [phone]  Enrrique Nelson [phone]  Ember Mosher [phone]  Mar counseling     [phone]  Childrens behavioral/ adult family     [phone]  [facility] Psych/ Health & Wellness     [phone]  [facility]     [phone]  [place]  Chuckie Buckner  [phone]  [facility]     [phone]  [facility] Dr. AILEEN Scruggs     [phone]  [facility] Psychological Services     [phone]

## 2018-04-17 NOTE — NURSING NOTE
"Chief Complaint   Patient presents with     Depression       Initial /62  Pulse 68  Ht 5' 4\" (1.626 m)  Wt 134 lb (60.8 kg)  BMI 23 kg/m2 Estimated body mass index is 23 kg/(m^2) as calculated from the following:    Height as of this encounter: 5' 4\" (1.626 m).    Weight as of this encounter: 134 lb (60.8 kg).  Medication Reconciliation: gage Clark      "

## 2018-04-18 LAB
ANA SER QL IF: NEGATIVE
DEPRECATED CALCIDIOL+CALCIFEROL SERPL-MC: 19 UG/L (ref 20–75)

## 2018-04-18 ASSESSMENT — ANXIETY QUESTIONNAIRES: GAD7 TOTAL SCORE: 14

## 2018-04-18 ASSESSMENT — PATIENT HEALTH QUESTIONNAIRE - PHQ9: SUM OF ALL RESPONSES TO PHQ QUESTIONS 1-9: 13

## 2018-06-20 ENCOUNTER — OFFICE VISIT (OUTPATIENT)
Dept: OBGYN | Facility: OTHER | Age: 23
End: 2018-06-20
Attending: ADVANCED PRACTICE MIDWIFE
Payer: COMMERCIAL

## 2018-06-20 VITALS
SYSTOLIC BLOOD PRESSURE: 114 MMHG | WEIGHT: 129 LBS | BODY MASS INDEX: 24.35 KG/M2 | DIASTOLIC BLOOD PRESSURE: 67 MMHG | HEIGHT: 61 IN

## 2018-06-20 DIAGNOSIS — Z01.419 WELL WOMAN EXAM WITH ROUTINE GYNECOLOGICAL EXAM: Primary | ICD-10-CM

## 2018-06-20 DIAGNOSIS — L85.3 DRY SKIN: ICD-10-CM

## 2018-06-20 DIAGNOSIS — Z12.4 ENCOUNTER FOR SCREENING FOR CERVICAL CANCER: ICD-10-CM

## 2018-06-20 PROCEDURE — 88142 CYTOPATH C/V THIN LAYER: CPT | Performed by: ADVANCED PRACTICE MIDWIFE

## 2018-06-20 PROCEDURE — 99395 PREV VISIT EST AGE 18-39: CPT | Performed by: ADVANCED PRACTICE MIDWIFE

## 2018-06-20 RX ORDER — TRIAMCINOLONE ACETONIDE 1 MG/G
CREAM TOPICAL
Qty: 80 G | Refills: 1 | Status: SHIPPED | OUTPATIENT
Start: 2018-06-20 | End: 2019-06-12

## 2018-06-20 ASSESSMENT — ANXIETY QUESTIONNAIRES
2. NOT BEING ABLE TO STOP OR CONTROL WORRYING: NOT AT ALL
5. BEING SO RESTLESS THAT IT IS HARD TO SIT STILL: NOT AT ALL
GAD7 TOTAL SCORE: 0
3. WORRYING TOO MUCH ABOUT DIFFERENT THINGS: NOT AT ALL
1. FEELING NERVOUS, ANXIOUS, OR ON EDGE: NOT AT ALL
7. FEELING AFRAID AS IF SOMETHING AWFUL MIGHT HAPPEN: NOT AT ALL
6. BECOMING EASILY ANNOYED OR IRRITABLE: NOT AT ALL
4. TROUBLE RELAXING: NOT AT ALL

## 2018-06-20 ASSESSMENT — PAIN SCALES - GENERAL: PAINLEVEL: NO PAIN (0)

## 2018-06-20 NOTE — MR AVS SNAPSHOT
"              After Visit Summary   6/20/2018    Marisol Monroy    MRN: [id number]           Patient Information     Date Of Birth          1995        Visit Information        Provider Department      6/20/2018 2:30 PM Kirill Leon APRN CNM [facility]        Today's Diagnoses     Well woman exam with routine gynecological exam    -  1    Encounter for screening for cervical cancer         Dry skin          Care Instructions    Return to office in one year for well woman visit and as needed.    Thank you for allowing Kirill TREVINO CNM and our OB team to participate in your care.  If you have a scheduling or an appointment question please contact [facility] Unit Coordinator at their direct line [phone].   ALL nursing questions or concerns can be directed to your OB nurse at: [phone] - Cyndy/Merissa               Follow-ups after your visit        Who to contact     If you have questions or need follow up information about today's clinic visit or your schedule please contact [facility] directly at [phone].  Normal or non-critical lab and imaging results will be communicated to you by MyChart, letter or phone within 4 business days after the clinic has received the results. If you do not hear from us within 7 days, please contact the clinic through MyChart or phone. If you have a critical or abnormal lab result, we will notify you by phone as soon as possible.  Submit refill requests through [employer] or call your pharmacy and they will forward the refill request to us. Please allow 3 business days for your refill to be completed.          Additional Information About Your Visit        Care EveryWhere ID     This is your Care EveryWhere ID. This could be used by other organizations to access your [place] medical records  [id number]        Your Vitals Were     Height Last Period BMI (Body Mass Index)             5' 1\" (1.549 m) 05/07/2018 24.37 kg/m2          " Blood Pressure from Last 3 Encounters:   06/20/18 114/67   04/17/18 102/62   12/12/17 104/68    Weight from Last 3 Encounters:   06/20/18 129 lb (58.5 kg)   04/17/18 134 lb (60.8 kg)   12/12/17 135 lb (61.2 kg)              We Performed the Following     A pap thin layer screen only - recommended age 21 - 24 years          Today's Medication Changes          These changes are accurate as of 6/20/18  5:14 PM.  If you have any questions, ask your nurse or doctor.               Start taking these medicines.        Dose/Directions    triamcinolone 0.1 % cream   Commonly known as:  KENALOG   Used for:  Dry skin   Started by:  Kirill Leon APRN CNM        Apply sparingly to affected area three times daily as needed   Quantity:  80 g   Refills:  1            Where to get your medicines      These medications were sent to [facility] - [place], [place] - [address]   [address] , [place] [address]     Phone:  [phone]     triamcinolone 0.1 % cream                Primary Care Provider Fax #    Physician No Ref-Primary [phone]       No address on file        Equal Access to Services     [facility] : Hadii amy shawo Somagdalena, waaxda luqadaha, qaybta kaalmada adeegyada, sofiya angeles . So [facility] [phone].    ATENCIÓN: Si habla español, tiene a anton disposición servicios gratuitos de asistencia lingüística. Llame al [phone].    We comply with applicable federal civil rights laws and [place] laws. We do not discriminate on the basis of race, color, national origin, age, disability, sex, sexual orientation, or gender identity.            Thank you!     Thank you for choosing [facility]  for your care. Our goal is always to provide you with excellent care. Hearing back from our patients is one way we can continue to improve our services. Please take a few minutes to complete the written survey that you may receive in the mail after your visit with us. Thank  you!             Your Updated Medication List - Protect others around you: Learn how to safely use, store and throw away your medicines at www.disposemymeds.org.          This list is accurate as of 6/20/18  5:14 PM.  Always use your most recent med list.                   Brand Name Dispense Instructions for use Diagnosis    norethindrone-ethinyl estradiol 1-20 MG-MCG per tablet    MICROGESTIN FE 1/20    112 tablet    Active pill daily for amenorrhea    Family planning, Anxiety and depression, Excessive or frequent menstruation, Fatigue, unspecified type, Hair loss       triamcinolone 0.1 % cream    KENALOG    80 g    Apply sparingly to affected area three times daily as needed    Dry skin

## 2018-06-20 NOTE — PATIENT INSTRUCTIONS
Return to office in one year for well woman visit and as needed.    Thank you for allowing Kirill TREVINO CNM and our OB team to participate in your care.  If you have a scheduling or an appointment question please contact [facility] Unit Coordinator at their direct line [phone].   ALL nursing questions or concerns can be directed to your OB nurse at: [phone] Cindy Naylor/Merissa

## 2018-06-20 NOTE — NURSING NOTE
"Chief Complaint   Patient presents with     Gyn Exam       Initial /67 (BP Location: Left arm, Patient Position: Chair, Cuff Size: Adult Regular)  Ht 5' 1\" (1.549 m)  Wt 129 lb (58.5 kg)  LMP 05/07/2018  BMI 24.37 kg/m2 Estimated body mass index is 24.37 kg/(m^2) as calculated from the following:    Height as of this encounter: 5' 1\" (1.549 m).    Weight as of this encounter: 129 lb (58.5 kg).  Medication Reconciliation: complete    Cyndy Curiel LPN    "

## 2018-06-20 NOTE — PROGRESS NOTES
ANNUAL    Marisol is a 22 year old  female who presents for annual exam.   Youngest child 7 months  Largest Child 7 lb 10 oz  GDM n  Hypertension n  Patient's last menstrual period was 2018.  Menses:  Cycles Amenorrhea with back to back combined oral contraceptive (TESSA)  When did they start 11 How many days bleedna pain naContraception TESSA.  Planning pregnancy: n  Concerns in addition to routine health maintenance?  Dry skin   GYNECOLOGIC HISTORY:   Surgery: Lap for ectopic 2016  History sexually transmitted infections:No STD history   Safe?  y  STI testing offered?  y  History of abnormal Pap smear: n  Problems with sex? (bleeding/pain)b  Family history of: breast cancer: n   Uterine cancer: n ovarian cancer: n   colon cancer: n    HEALTH MAINTENANCE:  Cholesterol: (No results found for: CHOL History of abnormal lipids: n  Mammo: n  Regular Self Breast Exams: n Calcium/Vitamin D or Vitamin: n Exercise y, walks    TSH: (  TSH   Date Value Ref Range Status   2018 1.88 0.40 - 4.00 mU/L Final    )  Pap; (  Lab Results   Component Value Date    PAP NIL 2016    )    HISTORY:  Obstetric History       T1      L1     SAB0   TAB0   Ectopic1   Multiple0   Live Births1       # Outcome Date GA Lbr Juni/2nd Weight Sex Delivery Anes PTL Lv   2 Term 17 41w6d / 00:36 7 lb 10.1 oz (3.46 kg) M Vag-Spont TOPICAL N KVNG      Name: Helder      Complications: Labor, precipitous      Apgar1:  8                Apgar5: 9   1 Ectopic 16     ECTOPIC           History reviewed. No pertinent past medical history.  Past Surgical History:   Procedure Laterality Date     LAPAROSCOPIC EVACUATION ECTOPIC PREGNANCY Right 2016    Procedure: LAPAROSCOPIC EVACUATION ECTOPIC PREGNANCY;  Surgeon: Basil Pabon MD;  Location: [facility]     Family History   Problem Relation Age of Onset     Hypertension Brother      Hypertension Mother      Social History     Social History     Marital status:       "Spouse name: N/A     Number of children: N/A     Years of education: N/A     Social History Main Topics     Smoking status: Passive Smoke Exposure - Never Smoker     Smokeless tobacco: Never Used     Alcohol use Yes      Comment: rare; social use     Drug use: No     Sexual activity: Yes     Other Topics Concern     None     Social History Narrative       Current Outpatient Prescriptions:      norethindrone-ethinyl estradiol (MICROGESTIN FE 1/20) 1-20 MG-MCG per tablet, Active pill daily for amenorrhea, Disp: 112 tablet, Rfl: 4   No Known Allergies    Past medical, surgical, social and family history were reviewed and updated in [facility].    ROS:    CONSTITUTIONAL:     NEGATIVE for fever, chills, change in weight  INTEGUMENTARY/SKIN:       NEGATIVE for worrisome rashes, moles or lesions.  Dry patches  EYES:     NEGATIVE for vision changes or irritation  ENT/MOUTH: NEGATIVE for ear, mouth and throat problems.  Right ear itching on occasion  RESP:     NEGATIVE for significant cough or SOB  CV:   NEGATIVE for chest pain, palpitations or peripheral edema  GI:     NEGATIVE for nausea, abdominal pain, heartburn, or change in bowel habits  :   NEGATIVE for frequency, dysuria, hematuria, or irregular bleeding,incontinence.  Some vaginal discharge  MUSCULOSKELETAL:     NEGATIVE for significant arthralgias or myalgia  NEURO:      NEGATIVE for weakness, dizziness or paresthesias  ENDOCRINE:      NEGATIVE for temperature intolerance, skin/hair changes  PSYCHIATRIC:      NEGATIVE for changes in mood or affect.     EXAM:   /67 (BP Location: Left arm, Patient Position: Chair, Cuff Size: Adult Regular)  Ht 5' 1\" (1.549 m)  Wt 129 lb (58.5 kg)  LMP 05/07/2018  BMI 24.37 kg/m2   BMI: Body mass index is 24.37 kg/(m^2).  Constitutional: healthy, alert and no distress  Head: Normocephalic. No masses, lesions, tenderness or abnormalities  Neck: Neck supple. Trachea midline. No adenopathy. Thyroid symmetric, normal size. "   Cardiovascular: RRR.   Respiratory: lungs clear   Breast: Breasts reveal mild symmetric fibrocystic densities, but there are no dominant, discrete, fixed or suspicious masses found.  Gastrointestinal: Abdomen soft, non-tender, non-distended. No masses, organomegaly.  :  Vulva:  No external lesions, normal female hair distribution, no inguinal adenopathy.    Urethra:  Midline, non-tender, well supported, no discharge  Vagina:  Moist, pink, no abnormal discharge, no lesions  Cervix: clean   Uterus:  Normal size, non-tender, freely mobile  Ovaries:  No masses appreciated  Rectal Exam: deferred  Musculoskeletal: extremities normal  Skin: no suspicious lesions or rashes  Psychiatric: Affect appropriate, cooperative,mentation appears normal.     COUNSELING:   regular exercise  healthy diet/nutrition  Immunizations   contraception  family planning  Folic Acid Counseling     reports that she is a non-smoker but has been exposed to tobacco smoke. She has never used smokeless tobacco.  Tobacco Cessation Action Plan: na  Body mass index is 24.37 kg/(m^2).  Weight management plan: continue healthy eating and exercise   FRAX Risk Assessment    ASSESSMENT:  22 year old female with satisfactory annual exam  Need of cervical cancer screening  Need of breast cancer screening  TESSA daily/back to back for amenorrhea  Sexually active  Patches of dry skin  Right ear itching/declines ENT referral at this time  PLAN:   Pap with High Risk hpv  Folic acid 400-800 mcg daily  Provider breast exam  ENT referral offered  Kenalog 0.1% cream apply sparingly to dry areas    Return to office: 1 year well woman visit and as needed    Total time of visit greater than 45 minutes with 35 minutes spent discussing routine well woman care and prevention    BELINDA Rodriguez, TELLOM

## 2018-06-21 ASSESSMENT — ANXIETY QUESTIONNAIRES: GAD7 TOTAL SCORE: 0

## 2018-06-21 ASSESSMENT — PATIENT HEALTH QUESTIONNAIRE - PHQ9: SUM OF ALL RESPONSES TO PHQ QUESTIONS 1-9: 1

## 2018-06-28 LAB
COPATH REPORT: NORMAL
PAP: NORMAL

## 2019-06-12 ENCOUNTER — OFFICE VISIT (OUTPATIENT)
Dept: OTOLARYNGOLOGY | Facility: OTHER | Age: 24
End: 2019-06-12
Attending: NURSE PRACTITIONER
Payer: COMMERCIAL

## 2019-06-12 VITALS
SYSTOLIC BLOOD PRESSURE: 90 MMHG | HEART RATE: 58 BPM | DIASTOLIC BLOOD PRESSURE: 60 MMHG | OXYGEN SATURATION: 99 % | HEIGHT: 64 IN | WEIGHT: 132 LBS | TEMPERATURE: 97.5 F | BODY MASS INDEX: 22.53 KG/M2

## 2019-06-12 DIAGNOSIS — L29.9 EAR ITCHING: Primary | ICD-10-CM

## 2019-06-12 PROCEDURE — 92504 EAR MICROSCOPY EXAMINATION: CPT | Performed by: NURSE PRACTITIONER

## 2019-06-12 PROCEDURE — 99213 OFFICE O/P EST LOW 20 MIN: CPT | Mod: 25 | Performed by: NURSE PRACTITIONER

## 2019-06-12 RX ORDER — CITALOPRAM HYDROBROMIDE 20 MG/1
20 TABLET ORAL DAILY
COMMUNITY
End: 2021-09-23

## 2019-06-12 ASSESSMENT — PAIN SCALES - GENERAL: PAINLEVEL: NO PAIN (0)

## 2019-06-12 ASSESSMENT — MIFFLIN-ST. JEOR: SCORE: 1328.75

## 2019-06-12 NOTE — LETTER
6/12/2019         RE: Marisol Peralta  [address] [place] [address]        Dear Colleague,    Thank you for referring your patient, Marisol Peralta, to the [facility]. Please see a copy of my visit note below.    Otolaryngology Note         Chief Complaint:     Patient presents with:  Consult: NPT Ear Cleaning         History of Present Illness:     Marisol Peralta is a 23 year old female seen today for concerns regarding chronically itchy ears.    This has been going on for over 1 year. It is present in both ears and never has any otalgia or otorrhea with it. This happens on a daily basis and there is nothing she can think of that exacerbates it.  No history of excessive cerumen.   Has only tried using Q-tips to help relieve the itching.  Denies change in hearing, ear pressure/fullness, tinnitus, vertigo.  No COM or otologic surgeries.  No seasonal allergies.         Medications:     Current Outpatient Rx   Medication Sig Dispense Refill     citalopram (CELEXA) 20 MG tablet Take 20 mg by mouth daily       norethindrone-ethinyl estradiol (MICROGESTIN FE 1/20) 1-20 MG-MCG per tablet Active pill daily for amenorrhea 112 tablet 4            Allergies:     Allergies: Patient has no known allergies.          Past Medical History:     History reviewed. No pertinent past medical history.         Past Surgical History:     Past Surgical History:   Procedure Laterality Date     LAPAROSCOPIC EVACUATION ECTOPIC PREGNANCY Right 11/4/2016    Procedure: LAPAROSCOPIC EVACUATION ECTOPIC PREGNANCY;  Surgeon: Basil Pabon MD;  Location: [facility] OR       ENT family history reviewed         Social History:     Social History     Tobacco Use     Smoking status: Passive Smoke Exposure - Never Smoker     Smokeless tobacco: Never Used   Substance Use Topics     Alcohol use: Yes     Comment: rare; social use     Drug use: No            Review of Systems:     ROS: See HPI         Physical Exam:     BP 90/60    "Pulse 58   Temp 97.5  F (36.4  C) (Tympanic)   Ht 1.626 m (5' 4\")   Wt 59.9 kg (132 lb)   SpO2 99%   BMI 22.66 kg/m       General - The patient is well nourished and well developed, and appears to have good nutritional status.  Alert and oriented to person and place, answers questions and cooperates with examination appropriately.   Head and Face - Normocephalic and atraumatic, with no gross asymmetry noted.  The facial nerve is intact, with strong symmetric movements.  Voice and Breathing - The patient was breathing comfortably without the use of accessory muscles. There was no wheezing, stridor. The patients voice was clear and strong, and had appropriate pitch and quality.  Ears - External ear normal. The ears were examined under microscopy bilaterally.  The right and left external auditory canals are patent, the tympanic membranes are intact without effusion or worrisome retractions.  Normal bony landmarks are appreciated.   There is no excess moisture, otorrhea or fungal findings.   Eyes - Extraocular movements intact, and the pupils were reactive to light. Sclera were not icteric or injected, conjunctiva were pink and moist.  Mouth - Examination of the oral cavity showed pink, healthy oral mucosa. Dentition in good condition. No lesions or ulcerations noted. The tongue was mobile and midline.   Throat - The walls of the oropharynx were smooth, pink, moist, symmetric, and had no lesions or ulcerations.  The tonsillar pillars and soft palate were symmetric. The uvula was midline on elevation.    Neck - Normal midline excursion of the laryngotracheal complex during swallowing.  Full range of motion on passive movement.  Palpation of the occipital, submental, submandibular, internal jugular chain, and supraclavicular nodes did not demonstrate any abnormal lymph nodes or masses.  Palpation of the thyroid was soft and smooth, with no nodules or goiter appreciated.  The trachea was mobile and midline.  Nose - " External contour is symmetric, no gross deflection or scars.  Nasal mucosa is pink and moist with no abnormal mucus.          Assessment and Plan:       ICD-10-CM    1. Ear itching L29.9      Ear exam shows no abnormal findings.    Stressed importance of stopping Q-tip use.    Trial of vinegar ear washes and if no improvement after 4 weeks of trying this, can try steroid otic drops.    No other symptoms of seasonal allergies, but can trial OTC antihistamine daily for a few weeks to see if this helps.    VINEGAR AND DISTILLED WATER IRRIGATION FOR EARS    Distilled vinegar ear drops/irrigations can be beneficial for individuals with:    Chronic moisture    Ear canal itching    Excessive cerumen (wax) build up    Directions:    Using a sterile cup, mix equal parts of white vinegar and distilled water (you can use 1/2 cup for each)    Poor 15mL of solution into affected ear(s) and tilt down so it runs all the way down into inside of ear and then tilt the other way to let it drain back out.    Completely dry the ear after irrigation, using a blow dryer on a low heat setting.     For chronically moist ears, this can be done twice a week for 2-3 months.  This can also be done before and after swimming to prevent Swimmer's Ear.    This will help maintain the ears natural acidity.  You may note relief from moisture, debris and itching.  It is important to have the ear evaluated routinely to be sure the ear wash is producing the desired effects without unwanted side effects.    Told her to contact me in 4-6 weeks if not effective, otherwise follow-up sooner as needed.    Ember Edwards NP  ENT  [facility], [place]  [phone]      Again, thank you for allowing me to participate in the care of your patient.        Sincerely,        Ember Edwards, BELINDA CNP

## 2019-06-12 NOTE — PATIENT INSTRUCTIONS
Thank you for allowing Ember Edwards CNP and our ENT team to participate in your care.  If your medications are too expensive or if there are any questions or concerns with your medication, please give the nurse a call.  If you have a scheduling or an appointment question please contact our Ear/Nose/Throat/Allergy Health Unit Coordinator at their direct line [phone].   ALL nursing questions or concerns can be directed to your ENT nurse at: [phone]- Justyna    VINEGAR AND DISTILLED WATER IRRIGATION FOR EARS    Distilled vinegar ear drops/irrigations can be beneficial for individuals with:    Chronic moisture    Ear canal itching    Excessive cerumen (wax) build up    Directions:    Using a sterile cup, mix equal parts of white vinegar and distilled water (you can use 1/2 cup for each)    Poor 15mL of solution into affected ear(s) and tilt down so it runs all the way down into inside of ear and then tilt the other way to let it drain back out.    Completely dry the ear after irrigation, using a blow dryer on a low heat setting.     For chronically moist ears, this can be done twice a week for 2-3 months.  This can also be done before and after swimming to prevent Swimmer's Ear.    This will help maintain the ears natural acidity.  You may note relief from moisture, debris and itching.  It is important to have the ear evaluated routinely to be sure the ear wash is producing the desired effects without unwanted side effects.       Notify me in 6 weeks if no improvement and we can try a steroid ear drop.    No fingers/q-tips in ears.    Return sooner as needed.

## 2019-06-12 NOTE — PROGRESS NOTES
"Otolaryngology Note         Chief Complaint:     Patient presents with:  Consult: NPT Ear Cleaning         History of Present Illness:     Marisol Peralta is a 23 year old female seen today for concerns regarding chronically itchy ears.    This has been going on for over 1 year. It is present in both ears and never has any otalgia or otorrhea with it. This happens on a daily basis and there is nothing she can think of that exacerbates it.  No history of excessive cerumen.   Has only tried using Q-tips to help relieve the itching.  Denies change in hearing, ear pressure/fullness, tinnitus, vertigo.  No COM or otologic surgeries.  No seasonal allergies.         Medications:     Current Outpatient Rx   Medication Sig Dispense Refill     citalopram (CELEXA) 20 MG tablet Take 20 mg by mouth daily       norethindrone-ethinyl estradiol (MICROGESTIN FE 1/20) 1-20 MG-MCG per tablet Active pill daily for amenorrhea 112 tablet 4            Allergies:     Allergies: Patient has no known allergies.          Past Medical History:     History reviewed. No pertinent past medical history.         Past Surgical History:     Past Surgical History:   Procedure Laterality Date     LAPAROSCOPIC EVACUATION ECTOPIC PREGNANCY Right 11/4/2016    Procedure: LAPAROSCOPIC EVACUATION ECTOPIC PREGNANCY;  Surgeon: Basil Pabon MD;  Location: [facility]       ENT family history reviewed         Social History:     Social History     Tobacco Use     Smoking status: Passive Smoke Exposure - Never Smoker     Smokeless tobacco: Never Used   Substance Use Topics     Alcohol use: Yes     Comment: rare; social use     Drug use: No            Review of Systems:     ROS: See HPI         Physical Exam:     BP 90/60   Pulse 58   Temp 97.5  F (36.4  C) (Tympanic)   Ht 1.626 m (5' 4\")   Wt 59.9 kg (132 lb)   SpO2 99%   BMI 22.66 kg/m      General - The patient is well nourished and well developed, and appears to have good nutritional status.  Alert and " oriented to person and place, answers questions and cooperates with examination appropriately.   Head and Face - Normocephalic and atraumatic, with no gross asymmetry noted.  The facial nerve is intact, with strong symmetric movements.  Voice and Breathing - The patient was breathing comfortably without the use of accessory muscles. There was no wheezing, stridor. The patients voice was clear and strong, and had appropriate pitch and quality.  Ears - External ear normal. The ears were examined under microscopy bilaterally.  The right and left external auditory canals are patent, the tympanic membranes are intact without effusion or worrisome retractions.  Normal bony landmarks are appreciated.   There is no excess moisture, otorrhea or fungal findings.   Eyes - Extraocular movements intact, and the pupils were reactive to light. Sclera were not icteric or injected, conjunctiva were pink and moist.  Mouth - Examination of the oral cavity showed pink, healthy oral mucosa. Dentition in good condition. No lesions or ulcerations noted. The tongue was mobile and midline.   Throat - The walls of the oropharynx were smooth, pink, moist, symmetric, and had no lesions or ulcerations.  The tonsillar pillars and soft palate were symmetric. The uvula was midline on elevation.    Neck - Normal midline excursion of the laryngotracheal complex during swallowing.  Full range of motion on passive movement.  Palpation of the occipital, submental, submandibular, internal jugular chain, and supraclavicular nodes did not demonstrate any abnormal lymph nodes or masses.  Palpation of the thyroid was soft and smooth, with no nodules or goiter appreciated.  The trachea was mobile and midline.  Nose - External contour is symmetric, no gross deflection or scars.  Nasal mucosa is pink and moist with no abnormal mucus.          Assessment and Plan:       ICD-10-CM    1. Ear itching L29.9      Ear exam shows no abnormal findings.    Stressed  importance of stopping Q-tip use.    Trial of vinegar ear washes and if no improvement after 4 weeks of trying this, can try steroid otic drops.    No other symptoms of seasonal allergies, but can trial OTC antihistamine daily for a few weeks to see if this helps.    VINEGAR AND DISTILLED WATER IRRIGATION FOR EARS    Distilled vinegar ear drops/irrigations can be beneficial for individuals with:    Chronic moisture    Ear canal itching    Excessive cerumen (wax) build up    Directions:    Using a sterile cup, mix equal parts of white vinegar and distilled water (you can use 1/2 cup for each)    Poor 15mL of solution into affected ear(s) and tilt down so it runs all the way down into inside of ear and then tilt the other way to let it drain back out.    Completely dry the ear after irrigation, using a blow dryer on a low heat setting.     For chronically moist ears, this can be done twice a week for 2-3 months.  This can also be done before and after swimming to prevent Swimmer's Ear.    This will help maintain the ears natural acidity.  You may note relief from moisture, debris and itching.  It is important to have the ear evaluated routinely to be sure the ear wash is producing the desired effects without unwanted side effects.    Told her to contact me in 4-6 weeks if not effective, otherwise follow-up sooner as needed.    Ember Edwards NP  ENT  [facility], [place]  [phone]

## 2020-02-27 NOTE — NURSING NOTE
"Chief Complaint   Patient presents with     Consult     NPT Ear Cleaning       Initial BP 90/60   Pulse 58   Temp 97.5  F (36.4  C) (Tympanic)   Ht 1.626 m (5' 4\")   Wt 59.9 kg (132 lb)   SpO2 99%   BMI 22.66 kg/m   Estimated body mass index is 22.66 kg/m  as calculated from the following:    Height as of this encounter: 1.626 m (5' 4\").    Weight as of this encounter: 59.9 kg (132 lb).  Medication Reconciliation: complete    Christi Bolton LPN  "
Add 27649 Cpt? (Important Note: In 2017 The Use Of 85502 Is Being Tracked By Cms To Determine Future Global Period Reimbursement For Global Periods): yes
Detail Level: Detailed

## 2020-03-02 ENCOUNTER — HEALTH MAINTENANCE LETTER (OUTPATIENT)
Age: 25
End: 2020-03-02

## 2020-05-26 DIAGNOSIS — L85.3 DRY SKIN: ICD-10-CM

## 2020-05-28 NOTE — TELEPHONE ENCOUNTER
kenalog      Last Written Prescription Date:  0  Last Fill Quantity: 0,   # refills: 0  Last Office Visit: 6/12/2019    Medication not active on current med list

## 2020-07-27 RX ORDER — TRIAMCINOLONE ACETONIDE 1 MG/G
CREAM TOPICAL
Qty: 80 G | Refills: 0 | Status: SHIPPED | OUTPATIENT
Start: 2020-07-27 | End: 2020-07-27

## 2020-07-27 RX ORDER — TRIAMCINOLONE ACETONIDE 1 MG/G
CREAM TOPICAL
Qty: 80 G | Refills: 0 | Status: SHIPPED | OUTPATIENT
Start: 2020-07-27 | End: 2021-09-23

## 2020-12-20 ENCOUNTER — HEALTH MAINTENANCE LETTER (OUTPATIENT)
Age: 25
End: 2020-12-20

## 2021-04-18 ENCOUNTER — HEALTH MAINTENANCE LETTER (OUTPATIENT)
Age: 26
End: 2021-04-18

## 2021-09-23 ENCOUNTER — APPOINTMENT (OUTPATIENT)
Dept: GENERAL RADIOLOGY | Facility: HOSPITAL | Age: 26
End: 2021-09-23
Attending: EMERGENCY MEDICINE
Payer: COMMERCIAL

## 2021-09-23 ENCOUNTER — HOSPITAL ENCOUNTER (EMERGENCY)
Facility: HOSPITAL | Age: 26
Discharge: HOME | End: 2021-09-23
Attending: EMERGENCY MEDICINE | Admitting: EMERGENCY MEDICINE
Payer: COMMERCIAL

## 2021-09-23 VITALS
WEIGHT: 135 LBS | BODY MASS INDEX: 23.05 KG/M2 | TEMPERATURE: 102.6 F | HEIGHT: 64 IN | RESPIRATION RATE: 20 BRPM | DIASTOLIC BLOOD PRESSURE: 84 MMHG | HEART RATE: 92 BPM | OXYGEN SATURATION: 97 % | SYSTOLIC BLOOD PRESSURE: 121 MMHG

## 2021-09-23 DIAGNOSIS — Z20.822 EXPOSURE TO COVID-19 VIRUS: ICD-10-CM

## 2021-09-23 LAB — SARS-COV-2 RNA RESP QL NAA+PROBE: POSITIVE

## 2021-09-23 PROCEDURE — 99283 EMERGENCY DEPT VISIT LOW MDM: CPT | Performed by: EMERGENCY MEDICINE

## 2021-09-23 PROCEDURE — 250N000013 HC RX MED GY IP 250 OP 250 PS 637: Performed by: EMERGENCY MEDICINE

## 2021-09-23 PROCEDURE — 99283 EMERGENCY DEPT VISIT LOW MDM: CPT

## 2021-09-23 PROCEDURE — 99284 EMERGENCY DEPT VISIT MOD MDM: CPT | Mod: 25

## 2021-09-23 PROCEDURE — U0003 INFECTIOUS AGENT DETECTION BY NUCLEIC ACID (DNA OR RNA); SEVERE ACUTE RESPIRATORY SYNDROME CORONAVIRUS 2 (SARS-COV-2) (CORONAVIRUS DISEASE [COVID-19]), AMPLIFIED PROBE TECHNIQUE, MAKING USE OF HIGH THROUGHPUT TECHNOLOGIES AS DESCRIBED BY CMS-2020-01-R: HCPCS | Performed by: EMERGENCY MEDICINE

## 2021-09-23 PROCEDURE — 71045 X-RAY EXAM CHEST 1 VIEW: CPT

## 2021-09-23 RX ORDER — IBUPROFEN 800 MG/1
800 TABLET, FILM COATED ORAL ONCE
Status: COMPLETED | OUTPATIENT
Start: 2021-09-23 | End: 2021-09-23

## 2021-09-23 RX ORDER — ACETAMINOPHEN 325 MG/1
975 TABLET ORAL ONCE
Status: COMPLETED | OUTPATIENT
Start: 2021-09-23 | End: 2021-09-23

## 2021-09-23 RX ADMIN — IBUPROFEN 800 MG: 800 TABLET ORAL at 03:21

## 2021-09-23 RX ADMIN — ACETAMINOPHEN 975 MG: 325 TABLET, FILM COATED ORAL at 03:21

## 2021-09-23 ASSESSMENT — MIFFLIN-ST. JEOR: SCORE: 1327.36

## 2021-09-23 NOTE — ED TRIAGE NOTES
Pt in for evaluation of nasal congestion and fever the last 24 hours. Reports her mother in law tested positive for covid on Friday and she was with her on the Wednesday 2 days prior. States she has had chills and body aches unrelieved with a dose of Nyquil yesterday evening at 6pm. Has had a fever of 102 at home and was feeling worse this am prompting visit to the ED.

## 2021-09-23 NOTE — ED NOTES
Pt provided with education on get well loop and pulse oximetry, home pulse ox monitor provided. Will follow up with Dr. Swanson per instruction and return to the ED with any new or worsening sx. Agreeable to plan of care. Reports feeling improved after provision of tylenol and ibuprofen. Departed ED at this time.

## 2021-09-24 ASSESSMENT — ENCOUNTER SYMPTOMS
FEVER: 1
SHORTNESS OF BREATH: 0
COUGH: 1
CHILLS: 1

## 2021-09-24 NOTE — ED PROVIDER NOTES
History     Chief Complaint   Patient presents with     Covid Concern     Covid 19 Testing     HPI  Marisol Peralta is a 26 year old female who is here w/ a few days of congestion, cough and fever. Feels tired. No nausea, vomiting, HA, diarrhea. Not vaccinated. Works w/ others who are sick and has a family member w/ covid. No cp or sob.    Allergies:  No Known Allergies    Problem List:    Patient Active Problem List    Diagnosis Date Noted     Well woman exam with routine gynecological exam 2018     Priority: Medium     Anxiety and depression 2018     Priority: Medium     Excessive or frequent menstruation 2018     Priority: Medium     Fatigue, unspecified type 2018     Priority: Medium      (spontaneous vaginal delivery) 2017     Priority: Medium     Andrea precipitous labor but controled delivery       Normal labor 2017     Priority: Medium     Need for influenza vaccination 2017     Priority: Medium     DONE 17       Constipation 2017     Priority: Medium     NO SHOW 2016     Priority: Medium     No showed Dr. Mendez 16, 16  No showed Dr. Mendez 17       Need for prophylactic vaccination against human papillomavirus 2016     Priority: Medium     Needs HPV #2  Declines 16  Declines 16       Need for vaccination against hepatitis B virus 2016     Priority: Medium     Needs Hep B #2  Declines 16  Declines 16       Hx of ectopic pregnancy 2016     Priority: Medium     Salpingostomy on right          Past Medical History:    History reviewed. No pertinent past medical history.    Past Surgical History:    Past Surgical History:   Procedure Laterality Date     LAPAROSCOPIC EVACUATION ECTOPIC PREGNANCY Right 2016    Procedure: LAPAROSCOPIC EVACUATION ECTOPIC PREGNANCY;  Surgeon: Basil Pabon MD;  Location: [facility]       Family History:    Family History   Problem Relation Age of Onset     Hypertension  "Brother      Hypertension Mother        Social History:  Marital Status:   [2]  Social History     Tobacco Use     Smoking status: Passive Smoke Exposure - Never Smoker     Smokeless tobacco: Never Used   Substance Use Topics     Alcohol use: Yes     Comment: rare; social use     Drug use: No        Medications:    No current outpatient medications on file.        Review of Systems   Constitutional: Positive for chills and fever.   Respiratory: Positive for cough. Negative for shortness of breath.        Physical Exam   BP: 121/84  Pulse: 92  Temp: (!) 102.6  F (39.2  C)  Resp: 20  Height: 162.6 cm (5' 4\")  Weight: 61.2 kg (135 lb)  SpO2: 97 %      Physical Exam  Constitutional:       General: She is not in acute distress.     Appearance: She is not diaphoretic.   HENT:      Head: Normocephalic and atraumatic.      Right Ear: External ear normal.      Left Ear: External ear normal.      Nose: No congestion or rhinorrhea.      Mouth/Throat:      Pharynx: Oropharynx is clear. No oropharyngeal exudate.   Eyes:      General: No scleral icterus.     Pupils: Pupils are equal, round, and reactive to light.   Cardiovascular:      Rate and Rhythm: Normal rate and regular rhythm.      Heart sounds: Normal heart sounds.   Pulmonary:      Effort: No respiratory distress.      Breath sounds: Normal breath sounds.   Abdominal:      General: Bowel sounds are normal.      Palpations: Abdomen is soft.      Tenderness: There is no abdominal tenderness.   Musculoskeletal:         General: No tenderness.      Cervical back: Normal range of motion and neck supple.      Right lower leg: No edema.      Left lower leg: No edema.   Skin:     General: Skin is warm.      Capillary Refill: Capillary refill takes less than 2 seconds.      Findings: No rash.   Neurological:      Mental Status: Mental status is at baseline.      Cranial Nerves: No cranial nerve deficit.   Psychiatric:         Mood and Affect: Mood normal.         Behavior: " Behavior normal.         ED Course        Procedures              Critical Care time:  none               No results found for this or any previous visit (from the past 24 hour(s)).    Medications   acetaminophen (TYLENOL) tablet 975 mg (975 mg Oral Given 9/23/21 0321)   ibuprofen (ADVIL/MOTRIN) tablet 800 mg (800 mg Oral Given 9/23/21 0321)       Assessments & Plan (with Medical Decision Making)     I have reviewed the nursing notes.    I have reviewed the findings, diagnosis, plan and need for follow up with the patient.   25 yo f here w/ probable covid, not requiring oxygen, stable for discharge home.    There are no discharge medications for this patient.      Final diagnoses:   Exposure to COVID-19 virus       9/23/2021   [facility]     Helder Cheema MD  09/24/21 1023

## 2021-10-03 ENCOUNTER — HEALTH MAINTENANCE LETTER (OUTPATIENT)
Age: 26
End: 2021-10-03

## 2022-05-14 ENCOUNTER — HEALTH MAINTENANCE LETTER (OUTPATIENT)
Age: 27
End: 2022-05-14

## 2022-09-04 ENCOUNTER — HEALTH MAINTENANCE LETTER (OUTPATIENT)
Age: 27
End: 2022-09-04

## 2023-04-17 ENCOUNTER — HOSPITAL ENCOUNTER (EMERGENCY)
Facility: HOSPITAL | Age: 28
Discharge: HOME | End: 2023-04-17
Attending: NURSE PRACTITIONER | Admitting: NURSE PRACTITIONER
Payer: COMMERCIAL

## 2023-04-17 VITALS
TEMPERATURE: 98.1 F | OXYGEN SATURATION: 98 % | RESPIRATION RATE: 16 BRPM | HEART RATE: 78 BPM | DIASTOLIC BLOOD PRESSURE: 83 MMHG | SYSTOLIC BLOOD PRESSURE: 123 MMHG

## 2023-04-17 DIAGNOSIS — H65.91 OME (OTITIS MEDIA WITH EFFUSION), RIGHT: ICD-10-CM

## 2023-04-17 PROCEDURE — 99213 OFFICE O/P EST LOW 20 MIN: CPT | Performed by: NURSE PRACTITIONER

## 2023-04-17 PROCEDURE — G0463 HOSPITAL OUTPT CLINIC VISIT: HCPCS

## 2023-04-17 ASSESSMENT — ENCOUNTER SYMPTOMS
ACTIVITY CHANGE: 1
CHILLS: 1
SINUS PAIN: 1
NAUSEA: 0
SORE THROAT: 0
COUGH: 1
MYALGIAS: 0
DIARRHEA: 0
MUSCULOSKELETAL NEGATIVE: 1
RHINORRHEA: 1
FEVER: 0
VOMITING: 0
HEADACHES: 1
EYES NEGATIVE: 1
APPETITE CHANGE: 0
SINUS PRESSURE: 1
FATIGUE: 0
SHORTNESS OF BREATH: 0

## 2023-04-17 NOTE — ED TRIAGE NOTES
Patient presents with c/o pain and pressure in right ear that started Friday. Was prescribed Augmentin, pain is getting worse.

## 2023-04-17 NOTE — DISCHARGE INSTRUCTIONS
Increase oral intake, cool mist vaporizer as needed, rest, avoid sharing utensils, practice good hand washing techniques, cover mouth when you cough and sneeze. Throw toothbursh away 24 hours after starting antibiotics.  Over the counter medications such as ibuprofen and/or acetaminophen for fever and generalized aches and pains. Ibuprofen 400 to 800 mg (2 - 4 tabs of over the counter med) every six to eight hours as needed;not to exceed maximum amount of 3200 mg in 24 hours.Tylenol 650 to 1000 mg every four to six hours as needed (not to exceed more than 4000 mg in a 24 hour period). May use interchangeably. Mucinex (guaifenesin) for cough. Chest rubs such as Royce's or Mentholatum may help reduce sore throat symptoms.  Chloraseptic spray for sore throat or menthol lozenges may be helpful for sore throat. Be reevaluated if symptoms persist longer than 10 - 14 days or worsen and if there is no improvement in 72 hours or worsening of symptoms.  Increase fluids. Complete all antibiotics even if feeling better. Taking antibiotics with food may decrease the stomach upset that can occur when taking antibiotics. Antibiotics frequently cause diarrhea. Probiotics or yogurt may help prevent or decrease these symptoms.     OTC decongestants (oral or topical).  Decongestants (oral or topical) cause vasoconstriction of the nasal mucosa.  We prefer oral pseudoephedrine to phenylephrine and other oral OTC nasal decongestants. Side effects of oral decongestants may include tachycardia, elevated diastolic blood pressure, and palpitations. Pseudoephedrine 30 to 60 mg every four to six hours as needed for congestion. (Maximum dose is 240 mg in 24 hours). Do not use longer than 72 hours.  THEN/OR:  Loratadine (Claritin) or cetirizine (Zyrtec) 20 mg  daily for ten to fourteen days to see if symptoms lessen or resolve. If the medication seems to help you may take 10 mg daily on an ongoing basis.  May buy over the counter.    Fluids,  herbs, and foods for sore throat relief -- Adjusting the temperature and texture of foods and beverages may provide local relief of sore throat pain. While data showing benefit are quite limited, these approaches are intuitive. We typically advise these measures since they are likely to be safe with minimal adverse effect, and patients often describe relief of symptoms.  We suggest hydration with frozen (eg, ice or popsicles) or heated liquids (eg, teas, soups), rather than room temperature or refrigerated fluids in patient with significant sore throat pain. Very cold foods can have a numbing-like effect that temporarily reduces or alleviates the pain of swallowing. Ice cubes or frozen popsicles facilitate hydration; ice cream and frozen yogurt provide caloric intake.  Warm fluids and foods, including teas, soups, and soft non-irritating foods, may be better tolerated by patients with throat pain than irritating foods (eg, rough-textured or spicy foods) or fluids at room temperatures. Foods that coat the throat, including honey and hard candies, can facilitate intake of calories while temporarily relieving throat pain.

## 2023-04-17 NOTE — ED TRIAGE NOTES
Patient presents to urgent care with c/o right ear pain. Was given an antibiotic but it feels like its getting worse. States she got the med's on Saturday and still not helping. Pain of 6/10. No known fevers right now.

## 2023-04-17 NOTE — ED PROVIDER NOTES
History     Chief Complaint   Patient presents with     Otalgia     HPI  Marisol Peralta is a 27 year old female who has had cold symptoms that are improving for the past week.  Developed right ear pain on Friday and has been treated with an antibiotic for the past 2 days.  right ear pain does not seem to be improving.  Accompanied with chills, runny nose, sinus pain and pressure, headaches, and cough.  Took ibuprofen and acetaminophen last evening with mild relief of her discomfort.  No known sick contacts.  Non-smoker.  Had second COVID vaccination  and influenza .  Denies fevers, vomiting, nausea, diarrhea, and shortness of breath.    Allergies:  No Known Allergies    Problem List:    Patient Active Problem List    Diagnosis Date Noted     Well woman exam with routine gynecological exam 2018     Priority: Medium     Anxiety and depression 2018     Priority: Medium     Excessive or frequent menstruation 2018     Priority: Medium     Fatigue, unspecified type 2018     Priority: Medium      (spontaneous vaginal delivery) 2017     Priority: Medium     Andrea precipitous labor but controled delivery       Normal labor 2017     Priority: Medium     Need for influenza vaccination 2017     Priority: Medium     DONE 17       Constipation 2017     Priority: Medium     NO SHOW 2016     Priority: Medium     No showed Dr. Mendez 16, 16  No showed Dr. Mendez 17       Need for prophylactic vaccination against human papillomavirus 2016     Priority: Medium     Needs HPV #2  Declines 16  Declines 16       Need for vaccination against hepatitis B virus 2016     Priority: Medium     Needs Hep B #2  Declines 16  Declines 16       Hx of ectopic pregnancy 2016     Priority: Medium     Salpingostomy on right          Past Medical History:    History reviewed. No pertinent past medical  history.    Past Surgical History:    Past Surgical History:   Procedure Laterality Date     LAPAROSCOPIC EVACUATION ECTOPIC PREGNANCY Right 11/4/2016    Procedure: LAPAROSCOPIC EVACUATION ECTOPIC PREGNANCY;  Surgeon: Basil Pabon MD;  Location: [facility]       Family History:    Family History   Problem Relation Age of Onset     Hypertension Brother      Hypertension Mother        Social History:  Marital Status:   [2]  Social History     Tobacco Use     Smoking status: Passive Smoke Exposure - Never Smoker     Smokeless tobacco: Never   Substance Use Topics     Alcohol use: Yes     Comment: rare; social use     Drug use: No        Medications:    No current outpatient medications on file.        Review of Systems   Constitutional: Positive for activity change and chills. Negative for appetite change, fatigue and fever.   HENT: Positive for ear pain (right), rhinorrhea, sinus pressure and sinus pain. Negative for sore throat.    Eyes: Negative.    Respiratory: Positive for cough. Negative for shortness of breath.    Gastrointestinal: Negative for diarrhea, nausea and vomiting.   Musculoskeletal: Negative.  Negative for myalgias.   Skin: Negative.    Neurological: Positive for headaches.       Physical Exam   BP: 123/83  Pulse: 78  Temp: 98.1  F (36.7  C)  Resp: 16  SpO2: 98 %      Physical Exam  Vitals and nursing note reviewed.   Constitutional:       General: She is in acute distress (Mild).      Appearance: She is normal weight.   HENT:      Head: Normocephalic.      Right Ear: Tympanic membrane and ear canal normal.      Left Ear: Tympanic membrane and ear canal normal.      Ears:      Comments: Moderate amount of clear fluid behind right tympanic membrane/s.       Nose: Mucosal edema and rhinorrhea present. Rhinorrhea is clear.      Right Sinus: No maxillary sinus tenderness or frontal sinus tenderness.      Left Sinus: No maxillary sinus tenderness or frontal sinus tenderness.      Mouth/Throat:       Lips: Pink.      Mouth: Mucous membranes are moist.      Pharynx: Uvula midline. No posterior oropharyngeal erythema.      Comments: Small to moderate amount of translucent post nasal drip noted posterior oropharynx    Eyes:      Conjunctiva/sclera: Conjunctivae normal.   Cardiovascular:      Rate and Rhythm: Normal rate and regular rhythm.      Heart sounds: Normal heart sounds. No murmur heard.  Pulmonary:      Effort: Pulmonary effort is normal. No respiratory distress.      Breath sounds: Normal breath sounds. No wheezing.   Lymphadenopathy:      Cervical: No cervical adenopathy.   Skin:     General: Skin is warm and dry.   Neurological:      Mental Status: She is alert and oriented to person, place, and time.   Psychiatric:         Behavior: Behavior normal.         ED Course                 Procedures           No results found for this or any previous visit (from the past 24 hour(s)).    Medications - No data to display    Assessments & Plan (with Medical Decision Making)     I have reviewed the nursing notes.    I have reviewed the findings, diagnosis, plan and need for follow up with the patient.  (H65.91) OME (otitis media with effusion), right  Comment: 27 year old female who has had cold symptoms that are improving for the past week.  Developed right ear pain on Friday and has been treated with an antibiotic for the past 2 days.  right ear pain does not seem to be improving.  Accompanied with chills, runny nose, sinus pain and pressure, headaches, and cough.  Took ibuprofen and acetaminophen last evening with mild relief of her discomfort.  No known sick contacts.  Non-smoker.  Had second COVID vaccination September, 2022 and influenza October, 2022.  Denies fevers, vomiting, nausea, diarrhea, and shortness of breath.    MDM:NHT. Lungs CTA    Plan:  Treat symptoms conservatively with acetaminophen and  ibuprofen (if applicable) for fevers, body aches, and headaches, guaifenesin and/or honey for cough.  May use chest rubs for sore throat and congestion, hot and cold liquids may help decrease sore throat and help you feel better. Increase fluids. You may utilize pseudoephedrine for congestion. THEN/OR: Loratadine (Claritin) or cetirizine (Zyrtec) 20 mg  daily for ten to fourteen days to see if symptoms lessen or resolve. If the medication seems to help you may take 10 mg daily on an ongoing basis.  May buy over the counter.   Return to be reevaluated by ER/UC or your primary care provider if symptoms worsen, you develop breathing difficulties, or you do not improve in a reasonable time frame. It can take several days for a cough to resolve. It can take ten to fourteen days for upper respiratory symptoms to resolve.   These discharge instructions and medications were reviewed with her and understanding verbalized.    This document was prepared using a combination of typing and voice generated software.  While every attempt was made for accuracy, spelling and grammatical errors may exist.    Medical Decision Making  The patient's presentation was of low complexity (an acute and uncomplicated illness or injury).    The patient's evaluation involved:  history and exam without other MDM data elements    The patient's management necessitated only low risk treatment.    New Prescriptions    No medications on file       Final diagnoses:   OME (otitis media with effusion), right       4/17/2023   [facility]       Radha Ornelas, CNP  04/17/23 2618

## 2023-06-03 ENCOUNTER — HEALTH MAINTENANCE LETTER (OUTPATIENT)
Age: 28
End: 2023-06-03

## 2023-07-26 ENCOUNTER — TRANSFERRED RECORDS (OUTPATIENT)
Dept: HEALTH INFORMATION MANAGEMENT | Facility: CLINIC | Age: 28
End: 2023-07-26

## 2023-08-03 ENCOUNTER — OFFICE VISIT (OUTPATIENT)
Dept: OBGYN | Facility: OTHER | Age: 28
End: 2023-08-03
Attending: OBSTETRICS & GYNECOLOGY
Payer: COMMERCIAL

## 2023-08-03 VITALS
OXYGEN SATURATION: 98 % | SYSTOLIC BLOOD PRESSURE: 116 MMHG | BODY MASS INDEX: 21 KG/M2 | HEIGHT: 64 IN | HEART RATE: 71 BPM | WEIGHT: 123 LBS | DIASTOLIC BLOOD PRESSURE: 66 MMHG

## 2023-08-03 DIAGNOSIS — N81.4 UTEROVAGINAL PROLAPSE: ICD-10-CM

## 2023-08-03 DIAGNOSIS — N94.10 DYSPAREUNIA IN FEMALE: ICD-10-CM

## 2023-08-03 DIAGNOSIS — Z12.4 SCREENING FOR CERVICAL CANCER: Primary | ICD-10-CM

## 2023-08-03 DIAGNOSIS — R10.2 PELVIC PAIN IN FEMALE: ICD-10-CM

## 2023-08-03 LAB
C TRACH DNA SPEC QL PROBE+SIG AMP: NEGATIVE
CLUE CELLS: NORMAL
N GONORRHOEA DNA SPEC QL NAA+PROBE: NEGATIVE
TRICHOMONAS, WET PREP: NORMAL
WBC'S/HIGH POWER FIELD, WET PREP: NORMAL
YEAST, WET PREP: NORMAL

## 2023-08-03 PROCEDURE — 87624 HPV HI-RISK TYP POOLED RSLT: CPT | Performed by: OBSTETRICS & GYNECOLOGY

## 2023-08-03 PROCEDURE — 87491 CHLMYD TRACH DNA AMP PROBE: CPT | Performed by: OBSTETRICS & GYNECOLOGY

## 2023-08-03 PROCEDURE — G0123 SCREEN CERV/VAG THIN LAYER: HCPCS | Performed by: OBSTETRICS & GYNECOLOGY

## 2023-08-03 PROCEDURE — 87591 N.GONORRHOEAE DNA AMP PROB: CPT | Performed by: OBSTETRICS & GYNECOLOGY

## 2023-08-03 PROCEDURE — 87210 SMEAR WET MOUNT SALINE/INK: CPT | Performed by: OBSTETRICS & GYNECOLOGY

## 2023-08-03 PROCEDURE — 99204 OFFICE O/P NEW MOD 45 MIN: CPT | Performed by: OBSTETRICS & GYNECOLOGY

## 2023-08-03 RX ORDER — ESOMEPRAZOLE MAGNESIUM 40 MG/1
40 CAPSULE, DELAYED RELEASE ORAL DAILY
COMMUNITY
Start: 2023-07-26

## 2023-08-03 RX ORDER — ALBUTEROL SULFATE 90 UG/1
2 AEROSOL, METERED RESPIRATORY (INHALATION) EVERY 4 HOURS PRN
COMMUNITY
Start: 2023-04-10

## 2023-08-03 RX ORDER — CITALOPRAM HYDROBROMIDE 20 MG/1
20 TABLET ORAL DAILY
COMMUNITY

## 2023-08-03 ASSESSMENT — PAIN SCALES - GENERAL: PAINLEVEL: NO PAIN (0)

## 2023-08-03 NOTE — PROGRESS NOTES
"CC:  Consult from [facility] Daniels  for  pelvic cramping, dyspareunia.  HPI:  Marisol Peralta is a 28 year old female P2 (vag).   Since the birth of her last child she has had deep dyspareunia where it feels like something is getting hit.  She also has low pelvic cramping R> L.  Denies GI/ sx.  She does note increased mucous discharge.    Denies AUB.  Menses regular, nml flow. No dysmenorrhea.        Frequency: No  Urgency:  No  Stress:  No  Nocturia:  No  Previous work-up:No  Contraception: Vas  Incomplete emptying:  :No  Sexually active:  Yes  Pelvic pressure:  No  Dysuria: No  Bulging:  No  Splinting: No  Constipation:  No    Past GYN history:        Last PAP smear:  Normal  Unsure date  Hysterectomy: No    Patients records are available and reviewed at today's visit.    No past medical history on file.    Past Surgical History:   Procedure Laterality Date    LAPAROSCOPIC EVACUATION ECTOPIC PREGNANCY Right 11/4/2016    Procedure: LAPAROSCOPIC EVACUATION ECTOPIC PREGNANCY;  Surgeon: Basil Pabon MD;  Location: [facility]       Family History   Problem Relation Age of Onset    Hypertension Brother     Hypertension Mother        Current Outpatient Medications   Medication Sig Dispense Refill    citalopram (CELEXA) 20 MG tablet Take 20 mg by mouth daily      esomeprazole (NEXIUM) 40 MG DR capsule Take 40 mg by mouth daily      albuterol (PROAIR HFA/PROVENTIL HFA/VENTOLIN HFA) 108 (90 Base) MCG/ACT inhaler Inhale 2 puffs into the lungs every 4 hours as needed (Patient not taking: Reported on 8/3/2023)         Allergies: Patient has no known allergies.    ROS:  CONSTITUTIONAL: NEGATIVE for fever, chills, change in weigh  GI: NEGATIVE except for above  : As Above  PSYCHIATRIC: NEGATIVE for changes in mood or affect    EXAM:  Blood pressure 116/66, pulse 71, height 1.626 m (5' 4\"), weight 55.8 kg (123 lb), SpO2 98 %, currently breastfeeding.   BMI= Body mass index is 21.11 kg/m .  General - pleasant female in no acute " distress.  Abdomen - soft, nontender, nondistended, no hepatosplenomegaly.  Pelvic - EG: normal adult female, BUS: within normal limits, Vagina: well rugated, no discharge, Cervix: no lesions or CMT, Uterus: firm,  normal sized and nontender, Adnexae: no masses or tenderness.  Prolapse:  Uterus grade 3, cystocele 2, rectocele 1-2   Urethral hypermobility:  Yes    Rectovaginal - deferred.  Musculoskeletal - no gross deformities or edema  Neurological - normal mental status.    Supine stress test:  negative        ASSESSMENT/PLAN:  (Z12.4) Screening for cervical cancer  (primary encounter diagnosis)  Plan: A pap thin layer screen with  HPV - recommended        age 30 - 65 years            (R10.2) Pelvic pain in female  Plan: Chlamydia trachomatis/Neisseria gonorrhoeae by         PCR, Wet preparation, US Pelvic Complete with         Transvaginal      (N94.10) Dyspareunia in female  Comment: Likely related to prolapse  Plan: Pelvic floor Physical Therapy Referral            (N81.4) Uterovaginal prolapse  Plan: Physical Therapy Referral           Symptomatic pelvic organ prolapse.    Discussed expectant, PT pessary, and surgical options with pt.  Risks and benefits of each. The natural h/o POP discussed.  Handouts were reviewed with patient.  Pt has my card and phone number to call as needed if problems in the interim or she does not hear her results.

## 2023-08-05 ENCOUNTER — APPOINTMENT (OUTPATIENT)
Dept: GENERAL RADIOLOGY | Facility: HOSPITAL | Age: 28
End: 2023-08-05
Attending: PHYSICIAN ASSISTANT
Payer: COMMERCIAL

## 2023-08-05 ENCOUNTER — HOSPITAL ENCOUNTER (EMERGENCY)
Facility: HOSPITAL | Age: 28
Discharge: HOME | End: 2023-08-05
Attending: PHYSICIAN ASSISTANT | Admitting: PHYSICIAN ASSISTANT
Payer: COMMERCIAL

## 2023-08-05 VITALS
OXYGEN SATURATION: 99 % | SYSTOLIC BLOOD PRESSURE: 117 MMHG | RESPIRATION RATE: 16 BRPM | HEART RATE: 69 BPM | DIASTOLIC BLOOD PRESSURE: 73 MMHG | TEMPERATURE: 99.2 F

## 2023-08-05 DIAGNOSIS — R07.81 PLEURITIC CHEST PAIN: ICD-10-CM

## 2023-08-05 DIAGNOSIS — J20.9 ACUTE BRONCHITIS WITH SYMPTOMS > 10 DAYS: ICD-10-CM

## 2023-08-05 PROCEDURE — 250N000013 HC RX MED GY IP 250 OP 250 PS 637: Performed by: PHYSICIAN ASSISTANT

## 2023-08-05 PROCEDURE — 71046 X-RAY EXAM CHEST 2 VIEWS: CPT

## 2023-08-05 PROCEDURE — 99213 OFFICE O/P EST LOW 20 MIN: CPT | Performed by: PHYSICIAN ASSISTANT

## 2023-08-05 PROCEDURE — G0463 HOSPITAL OUTPT CLINIC VISIT: HCPCS

## 2023-08-05 RX ORDER — AZITHROMYCIN 250 MG/1
TABLET, FILM COATED ORAL
Qty: 6 TABLET | Refills: 0 | Status: SHIPPED | OUTPATIENT
Start: 2023-08-05 | End: 2023-08-10

## 2023-08-05 RX ORDER — PREDNISONE 20 MG/1
TABLET ORAL
Qty: 10 TABLET | Refills: 0 | Status: SHIPPED | OUTPATIENT
Start: 2023-08-05

## 2023-08-05 RX ORDER — IBUPROFEN 800 MG/1
800 TABLET, FILM COATED ORAL ONCE
Status: COMPLETED | OUTPATIENT
Start: 2023-08-05 | End: 2023-08-05

## 2023-08-05 RX ADMIN — IBUPROFEN 800 MG: 800 TABLET, FILM COATED ORAL at 14:53

## 2023-08-05 ASSESSMENT — ENCOUNTER SYMPTOMS
CONSTITUTIONAL NEGATIVE: 1
LIGHT-HEADEDNESS: 0
NEUROLOGICAL NEGATIVE: 1
COUGH: 1
SINUS PRESSURE: 0
FATIGUE: 0
FEVER: 0
HEADACHES: 0
SHORTNESS OF BREATH: 0
SORE THROAT: 0
WHEEZING: 0

## 2023-08-05 NOTE — DISCHARGE INSTRUCTIONS
Antiinflammatory for 3-5 days. (Start with 1000mg tylenol in 4 hrs, then in 8 hrs take 800mg ibuprofen. Rotate like this for 3-5 days.)  No pneumonia, no bony abnormality and no collapsed lung.   Will treat as costochondritis/pleurisy.   May trial the prednisone. May fill the antibiotic if things are not improving (start to get fatigue, < appetite, low grade fevers and persistent cough)

## 2023-08-05 NOTE — ED PROVIDER NOTES
History     Chief Complaint   Patient presents with    Shoulder Pain     C/o lt shoulder pain, notes increased pain with deep breathing and less pain when raising up her arm. Notes resp cold symptoms with cough for the past couple of weeks.      HPI  Marisol Peralta is a 28 year old female who presents with acute onset LT shoulder/chest pain. She reports sharp pain just below the clavicle, ROM of shoulder makes pain better, deep breaths makes pain worse. She has been dealing with URI/cough for 2 weeks, OTC cough medicine helped some last night. She reports cough is productive, thought Sx were improving until last night. No fevers. No shortness of breath, wheezing.     Allergies:  No Known Allergies    Problem List:    Patient Active Problem List    Diagnosis Date Noted    Well woman exam with routine gynecological exam 2018     Priority: Medium    Anxiety and depression 2018     Priority: Medium    Excessive or frequent menstruation 2018     Priority: Medium    Fatigue, unspecified type 2018     Priority: Medium     (spontaneous vaginal delivery) 2017     Priority: Medium     Andrea precipitous labor but controled delivery      Normal labor 2017     Priority: Medium    Need for influenza vaccination 2017     Priority: Medium     DONE 17      Constipation 2017     Priority: Medium    NO SHOW 2016     Priority: Medium     No showed Dr. Mendez 16, 16  No showed Dr. Mendez 17      Need for prophylactic vaccination against human papillomavirus 2016     Priority: Medium     Needs HPV #2  Declines 16  Declines 16      Need for vaccination against hepatitis B virus 2016     Priority: Medium     Needs Hep B #2  Declines 16  Declines 16      Hx of ectopic pregnancy 2016     Priority: Medium     Salpingostomy on right          Past Medical History:    No past medical history on file.    Past Surgical History:     Past Surgical History:   Procedure Laterality Date    LAPAROSCOPIC EVACUATION ECTOPIC PREGNANCY Right 11/4/2016    Procedure: LAPAROSCOPIC EVACUATION ECTOPIC PREGNANCY;  Surgeon: Basil Pabon MD;  Location: [facility]       Family History:    Family History   Problem Relation Age of Onset    Hypertension Brother     Hypertension Mother        Social History:  Marital Status:   [2]  Social History     Tobacco Use    Smoking status: Never     Passive exposure: Yes    Smokeless tobacco: Never   Substance Use Topics    Alcohol use: Yes     Comment: rare; social use    Drug use: No        Medications:    albuterol (PROAIR HFA/PROVENTIL HFA/VENTOLIN HFA) 108 (90 Base) MCG/ACT inhaler  azithromycin (ZITHROMAX) 250 MG tablet  citalopram (CELEXA) 20 MG tablet  esomeprazole (NEXIUM) 40 MG DR capsule  predniSONE (DELTASONE) 20 MG tablet          Review of Systems   Constitutional: Negative.  Negative for fatigue and fever.   HENT:  Positive for congestion. Negative for ear pain, sinus pressure and sore throat.    Respiratory:  Positive for cough. Negative for shortness of breath and wheezing.    Cardiovascular:  Positive for chest pain (chest wall/LT shoulder).   Neurological: Negative.  Negative for light-headedness and headaches.       Physical Exam   BP: 117/73  Pulse: 69  Temp: 99.2  F (37.3  C)  Resp: 16  SpO2: 99 %      Physical Exam  Vitals and nursing note reviewed.   Constitutional:       General: She is not in acute distress.     Appearance: She is not toxic-appearing.   HENT:      Right Ear: Tympanic membrane normal.      Left Ear: Tympanic membrane normal.      Mouth/Throat:      Mouth: Mucous membranes are moist.      Pharynx: Oropharynx is clear.   Cardiovascular:      Rate and Rhythm: Normal rate and regular rhythm.      Heart sounds: No murmur heard.  Pulmonary:      Effort: Pulmonary effort is normal.      Breath sounds: Normal breath sounds. No wheezing or rales.      Comments: Pain improves with  palpation, left arm overhead.  Pain worsens with coughing and deep breaths.  Musculoskeletal:      Comments: LET shoulder: No gross deformity.  Nontender clavicle, GH, AC.  Pain improves with palpation over anterior chest wall.  Completes NFL sign, with arm elevated, pain improves.   No pain with reach across, Apley scratch.   Skin:     General: Skin is warm and dry.   Neurological:      Mental Status: She is alert.         ED Course       Results for orders placed or performed during the hospital encounter of 08/05/23 (from the past 24 hour(s))   Chest XR,  PA & LAT    Narrative    Procedure:XR CHEST 2 VIEWS    Clinical history:Female, 28 years, pleuritic chest pain, URI x 2 weeks    Technique: Two views are submitted.    Comparison: 9/23/2021    Findings: The cardiac silhouette is normal. The pulmonary vasculature  is normal.    The lungs are clear. Bony structures are unremarkable.      Impression    Impression:   No acute abnormality. No evidence of acute or active cardiopulmonary  disease.    ZEINAB ERNANDEZ MD         SYSTEM ID:  [id number]       Medications   ibuprofen (ADVIL/MOTRIN) tablet 800 mg (800 mg Oral $Given 8/5/23 1454)       Assessments & Plan (with Medical Decision Making)     I have reviewed the nursing notes.  I have reviewed the findings, diagnosis, plan and need for follow up with the patient.    Discharge Medication List as of 8/5/2023  3:03 PM        START taking these medications    Details   azithromycin (ZITHROMAX) 250 MG tablet Take 2 tablets (500 mg) by mouth daily for 1 day, THEN 1 tablet (250 mg) daily for 4 days., Disp-6 tablet, R-0, Local Print      predniSONE (DELTASONE) 20 MG tablet Take two tablets (= 40mg) each day for 5 (five) days, Disp-10 tablet, R-0, Local Print             Final diagnoses:   Pleuritic chest pain   Acute bronchitis with symptoms > 10 days   CXR negative for infiltrate, mass, lung markings reach periphery.  History and physical consistent with pleurisy.   Patient prefers to start with ibuprofen Tylenol rotation, remain hydrated.  If she develops fever or cough worsens over the past 2 to 3 days will start Zithromax versus recheck with primary.  She will seek care with any worsening or concerns.  Written instructions and pleuritic CP given to patient for reference.    8/5/2023   [facility]       Crow Steinberg PA  08/05/23 1528

## 2023-08-05 NOTE — ED TRIAGE NOTES
Pt presents with c/o left shoulder pain  Hurts to breath. Started about 45 minutes ago  Does bring up that she is having congestion/drainage  Centralized to the front of her left shoulder next to the collar bone.     No otc meds taken

## 2023-08-08 LAB
BKR LAB AP GYN ADEQUACY: NORMAL
BKR LAB AP GYN INTERPRETATION: NORMAL
BKR LAB AP HPV REFLEX: NORMAL
BKR LAB AP PREVIOUS ABNORMAL: NORMAL
PATH REPORT.COMMENTS IMP SPEC: NORMAL
PATH REPORT.COMMENTS IMP SPEC: NORMAL
PATH REPORT.RELEVANT HX SPEC: NORMAL

## 2023-08-09 ENCOUNTER — HOSPITAL ENCOUNTER (OUTPATIENT)
Dept: ULTRASOUND IMAGING | Facility: HOSPITAL | Age: 28
Discharge: HOME | End: 2023-08-09
Attending: OBSTETRICS & GYNECOLOGY | Admitting: OBSTETRICS & GYNECOLOGY
Payer: COMMERCIAL

## 2023-08-09 DIAGNOSIS — R10.2 PELVIC PAIN IN FEMALE: ICD-10-CM

## 2023-08-09 PROCEDURE — 76830 TRANSVAGINAL US NON-OB: CPT

## 2023-08-10 LAB
HUMAN PAPILLOMA VIRUS 16 DNA: NEGATIVE
HUMAN PAPILLOMA VIRUS 18 DNA: NEGATIVE
HUMAN PAPILLOMA VIRUS FINAL DIAGNOSIS: NORMAL
HUMAN PAPILLOMA VIRUS OTHER HR: NEGATIVE

## 2023-10-31 ENCOUNTER — HOSPITAL ENCOUNTER (EMERGENCY)
Facility: HOSPITAL | Age: 28
Discharge: HOME | End: 2023-10-31
Attending: PHYSICIAN ASSISTANT | Admitting: PHYSICIAN ASSISTANT
Payer: COMMERCIAL

## 2023-10-31 ENCOUNTER — APPOINTMENT (OUTPATIENT)
Dept: GENERAL RADIOLOGY | Facility: HOSPITAL | Age: 28
End: 2023-10-31
Attending: PHYSICIAN ASSISTANT
Payer: COMMERCIAL

## 2023-10-31 VITALS
RESPIRATION RATE: 19 BRPM | TEMPERATURE: 98.8 F | DIASTOLIC BLOOD PRESSURE: 78 MMHG | HEART RATE: 62 BPM | OXYGEN SATURATION: 98 % | SYSTOLIC BLOOD PRESSURE: 126 MMHG

## 2023-10-31 DIAGNOSIS — S16.1XXA STRAIN OF NECK MUSCLE, INITIAL ENCOUNTER: ICD-10-CM
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
## 2023-10-31 PROCEDURE — G0463 HOSPITAL OUTPT CLINIC VISIT: HCPCS

## 2023-10-31 PROCEDURE — 72040 X-RAY EXAM NECK SPINE 2-3 VW: CPT

## 2023-10-31 PROCEDURE — 99213 OFFICE O/P EST LOW 20 MIN: CPT | Performed by: PHYSICIAN ASSISTANT

## 2023-10-31 ASSESSMENT — ENCOUNTER SYMPTOMS
WOUND: 0
RHINORRHEA: 0
CARDIOVASCULAR NEGATIVE: 1
NAUSEA: 0
ABDOMINAL PAIN: 0
RESPIRATORY NEGATIVE: 1
NUMBNESS: 0
LIGHT-HEADEDNESS: 0
SEIZURES: 0
CONFUSION: 0
CONSTITUTIONAL NEGATIVE: 1
WEAKNESS: 0
DIZZINESS: 0
SPEECH DIFFICULTY: 0
HEADACHES: 1
VOMITING: 0
NECK PAIN: 1
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
## 2023-10-31 NOTE — ED NOTES
Crow SANCHEZ assessed patient in triage and determined patient Urgent Care appropriate. Will be seen in Urgent Care.
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
## 2023-10-31 NOTE — DISCHARGE INSTRUCTIONS
- Rest, ice  - Rotate ibuprofen and tylenol every 4-6 hrs for 2-3 days  - Topicals: Arnica Cream (10$ at [employer]) and/or Capsaicin. (1/4 teaspoon cayenne pepper in a palmful olive oil and rub in 2-3 times daily for 5-7 days for pain)  Must come back with:  - severe headache  - vomiting x2  - walking/talking/acting funny  - ANY concern about belly, breathing, pain/numbness

## 2023-10-31 NOTE — LETTER
October 31, 2023      To Whom It May Concern:      Marisol Peralta was seen in our Emergency Department today, 10/31/23.  I expect her condition to improve over the next 1-3 days.  She may return to work/school when improved.        Sincerely,        LAURA Uriarte

## 2024-04-13 ENCOUNTER — HOSPITAL ENCOUNTER (EMERGENCY)
Facility: HOSPITAL | Age: 29
Discharge: HOME | End: 2024-04-13
Attending: NURSE PRACTITIONER | Admitting: NURSE PRACTITIONER
Payer: COMMERCIAL

## 2024-04-13 VITALS
HEIGHT: 64 IN | BODY MASS INDEX: 22.57 KG/M2 | HEART RATE: 74 BPM | WEIGHT: 132.2 LBS | DIASTOLIC BLOOD PRESSURE: 68 MMHG | SYSTOLIC BLOOD PRESSURE: 106 MMHG | TEMPERATURE: 99.6 F | RESPIRATION RATE: 16 BRPM | OXYGEN SATURATION: 98 %

## 2024-04-13 DIAGNOSIS — J02.0 ACUTE STREPTOCOCCAL PHARYNGITIS: Primary | ICD-10-CM

## 2024-04-13 LAB — GROUP A STREP BY PCR: DETECTED

## 2024-04-13 PROCEDURE — 99213 OFFICE O/P EST LOW 20 MIN: CPT | Performed by: NURSE PRACTITIONER

## 2024-04-13 PROCEDURE — 87651 STREP A DNA AMP PROBE: CPT | Performed by: NURSE PRACTITIONER

## 2024-04-13 PROCEDURE — G0463 HOSPITAL OUTPT CLINIC VISIT: HCPCS

## 2024-04-13 RX ORDER — AMOXICILLIN 500 MG/1
500 CAPSULE ORAL 2 TIMES DAILY
Qty: 20 CAPSULE | Refills: 0 | Status: SHIPPED | OUTPATIENT
Start: 2024-04-13 | End: 2024-04-23

## 2024-04-13 ASSESSMENT — ENCOUNTER SYMPTOMS
EYE DISCHARGE: 0
ABDOMINAL PAIN: 0
SHORTNESS OF BREATH: 0
SORE THROAT: 1
DIARRHEA: 0
MYALGIAS: 0
CHILLS: 0
RHINORRHEA: 0
PSYCHIATRIC NEGATIVE: 1
TROUBLE SWALLOWING: 0
COUGH: 0
EYE REDNESS: 0
VOMITING: 0
HEADACHES: 0
FEVER: 0

## 2024-04-13 ASSESSMENT — ACTIVITIES OF DAILY LIVING (ADL): ADLS_ACUITY_SCORE: 35

## 2024-04-13 NOTE — ED PROVIDER NOTES
History     Chief Complaint   Patient presents with    Pharyngitis     HPI  Marisol Peralta is a 28 year old female who presents to urgent care today (ambulatory) with complaints of a sore throat which started this morning.  Staying hydrated.  No rashes.  Denies any fever, chills, nausea, vomiting, diarrhea, SOB or chest pain.  No other concerns.      Allergies:  No Known Allergies    Problem List:    Patient Active Problem List    Diagnosis Date Noted    Well woman exam with routine gynecological exam 2018     Priority: Medium    Anxiety and depression 2018     Priority: Medium    Excessive or frequent menstruation 2018     Priority: Medium    Fatigue, unspecified type 2018     Priority: Medium     (spontaneous vaginal delivery) 2017     Priority: Medium     Andrea precipitous labor but controled delivery      Normal labor 2017     Priority: Medium    Need for influenza vaccination 2017     Priority: Medium     DONE 17      Constipation 2017     Priority: Medium    NO SHOW 2016     Priority: Medium     No showed Dr. Mendez 16, 16  No showed Dr. Mendez 17      Need for prophylactic vaccination against human papillomavirus 2016     Priority: Medium     Needs HPV #2  Declines 16  Declines 16      Need for vaccination against hepatitis B virus 2016     Priority: Medium     Needs Hep B #2  Declines 16  Declines 16      Hx of ectopic pregnancy 2016     Priority: Medium     Salpingostomy on right          Past Medical History:    No past medical history on file.    Past Surgical History:    Past Surgical History:   Procedure Laterality Date    LAPAROSCOPIC EVACUATION ECTOPIC PREGNANCY Right 2016    Procedure: LAPAROSCOPIC EVACUATION ECTOPIC PREGNANCY;  Surgeon: Basil Pabon MD;  Location: [facility]       Family History:    Family History   Problem Relation Age of Onset    Hypertension Brother      "Hypertension Mother        Social History:  Marital Status:   [2]  Social History     Tobacco Use    Smoking status: Never     Passive exposure: Yes    Smokeless tobacco: Never   Substance Use Topics    Alcohol use: Yes     Comment: rare; social use    Drug use: No        Medications:    albuterol (PROAIR HFA/PROVENTIL HFA/VENTOLIN HFA) 108 (90 Base) MCG/ACT inhaler  amoxicillin (AMOXIL) 500 MG capsule  citalopram (CELEXA) 20 MG tablet  esomeprazole (NEXIUM) 40 MG DR capsule  predniSONE (DELTASONE) 20 MG tablet      Review of Systems   Constitutional:  Negative for chills and fever.   HENT:  Positive for sore throat. Negative for congestion, ear pain, rhinorrhea and trouble swallowing.    Eyes:  Negative for discharge and redness.   Respiratory:  Negative for cough and shortness of breath.    Cardiovascular:  Negative for chest pain.   Gastrointestinal:  Negative for abdominal pain, diarrhea and vomiting.   Genitourinary:  Negative for decreased urine volume.   Musculoskeletal:  Negative for myalgias.   Skin:  Negative for rash.   Neurological:  Negative for headaches.   Psychiatric/Behavioral: Negative.       Physical Exam   BP: 106/68  Pulse: 74  Temp: 99.6  F (37.6  C)  Resp: 16  Height: 162.6 cm (5' 4\")  Weight: 60 kg (132 lb 3.2 oz)  SpO2: 98 %    Physical Exam  Vitals and nursing note reviewed.   Constitutional:       General: She is not in acute distress.     Appearance: Normal appearance. She is not ill-appearing or toxic-appearing.   HENT:      Right Ear: Tympanic membrane, ear canal and external ear normal.      Left Ear: Tympanic membrane, ear canal and external ear normal.      Nose: Nose normal. No congestion or rhinorrhea.      Mouth/Throat:      Mouth: Mucous membranes are moist.      Pharynx: Oropharynx is clear. Posterior oropharyngeal erythema present. No oropharyngeal exudate.      Tonsils: Tonsillar exudate present. No tonsillar abscesses. 2+ on the right. 2+ on the left.   Cardiovascular: "      Rate and Rhythm: Normal rate and regular rhythm.      Pulses: Normal pulses.      Heart sounds: Normal heart sounds.   Pulmonary:      Effort: Pulmonary effort is normal.      Breath sounds: Normal breath sounds.   Abdominal:      General: Bowel sounds are normal.      Palpations: Abdomen is soft.      Tenderness: There is no abdominal tenderness.   Musculoskeletal:      Cervical back: Normal range of motion and neck supple. No rigidity or tenderness.   Lymphadenopathy:      Cervical: No cervical adenopathy.   Skin:     General: Skin is warm and dry.      Capillary Refill: Capillary refill takes less than 2 seconds.   Neurological:      Mental Status: She is alert.   Psychiatric:         Mood and Affect: Mood normal.       ED Course     Results for orders placed or performed during the hospital encounter of 04/13/24 (from the past 24 hour(s))   Group A Streptococcus PCR Throat Swab    Specimen: Throat; Swab   Result Value Ref Range    Group A strep by PCR Detected (A) Not Detected    Narrative    The Xpert Xpress Strep A test, performed on the "[employer]  Instrument Systems, is a rapid, qualitative in vitro diagnostic test for the detection of Streptococcus pyogenes (Group A ß-hemolytic Streptococcus, Strep A) in throat swab specimens from patients with signs and symptoms of pharyngitis. The Xpert Xpress Strep A test can be used as an aid in the diagnosis of Group A Streptococcal pharyngitis. The assay is not intended to monitor treatment for Group A Streptococcus infections. The Xpert Xpress Strep A test utilizes an automated real-time polymerase chain reaction (PCR) to detect Streptococcus pyogenes DNA.       Medications - No data to display    Assessments & Plan (with Medical Decision Making)     I have reviewed the nursing notes.    I have reviewed the findings, diagnosis, plan and need for follow up with the patient.  (J02.0) Acute streptococcal pharyngitis  (primary encounter diagnosis)  Plan:   Patient  ambulatory with a nontoxic appearance.  Lungs clear throughout.  No signs of otitis media.  Throat erythema, tonsillar exudate, no signs of peritonsillar abscess, strep test positive.  Staying hydrated.  No rashes.  Will treat strep with amoxicillin.  Switch out toothbrush 24 hours after starting antibiotic.  Alternate Tylenol and ibuprofen as needed for pain or fever.  Push fluids.  Warm salt water gargles or honey as needed for sore throat.  Follow-up with primary care provider or return to urgent care/ED with any worsening in condition or additional concerns.  Patient in agreement treatment plan.    Discharge Medication List as of 4/13/2024  9:12 AM        Final diagnoses:   Acute streptococcal pharyngitis     4/13/2024   [facility]       Sasha Williamson NP  04/13/24 0946

## 2024-04-13 NOTE — ED TRIAGE NOTES
Pt presents with c/o a sore throat. Sx started last night. Reports back of throat is red and has some white patches. Denies known exposures. Reports  where pt's kids go has strep. No otc meds. Pt refuses multiplex covid testing at this time.

## 2024-04-13 NOTE — DISCHARGE INSTRUCTIONS
Will notify you of strep result once it finalizes.  If positive switch out toothbrush 24 hours after starting antibiotic.      Symptomatic treatments recommended.  -Discussed that antibiotics would not help symptoms of viral URI. Education provided on symptoms of secondary bacterial infection such as new fever, chills, rigors, shortness of breath, increased work of breathing, that can occur with viral URI and need for further evaluation, if they occur.   - Ensure you are staying hydrated by drinking plenty of fluids or eating foods such as popsicles, jello, pudding.  - Honey can be soothing for sore throat  - Warm salt water gurgles can help soothe sore throat  - Rest  - Humidifier can help with congestion and help keep mucus membranes such as throat and nose from drying out.  - Sleeping slightly propped up can help with congestion and postnasal drainage that can worsen cough at bedtime.  - As long as you have never been told to take Tylenol and/or Ibuprofen you can use them to manage fever and body aches per package instructions  Make sure you eat when you take ibuprofen to avoid stomach upset.  - OTC cough medications per package instructions to help with cough. Check to see if the cough/cold medication already has acetaminophen (Tylenol) in it. If it does avoid taking additional Tylenol.  - If sudden onset of new fever, worsening symptoms return for further evaluation.  - OTC nasal steroid such as Flonase can help decrease sinus inflammation to help with congestion.  - Education provided on symptoms of post-viral bacterial infections including ear infection and pneumonia. This would require re-evaluation for treatment.    Follow up with primary care provider or return to urgent care/ED with any worsening in condition or additional concerns.

## 2024-07-07 ENCOUNTER — HEALTH MAINTENANCE LETTER (OUTPATIENT)
Age: 29
End: 2024-07-07

## 2025-07-13 ENCOUNTER — HEALTH MAINTENANCE LETTER (OUTPATIENT)
Age: 30
End: 2025-07-13

## 2025-07-28 ENCOUNTER — APPOINTMENT (OUTPATIENT)
Dept: OCCUPATIONAL MEDICINE | Facility: OTHER | Age: 30
End: 2025-07-28